# Patient Record
Sex: FEMALE | Race: BLACK OR AFRICAN AMERICAN | NOT HISPANIC OR LATINO | Employment: FULL TIME
[De-identification: names, ages, dates, MRNs, and addresses within clinical notes are randomized per-mention and may not be internally consistent; named-entity substitution may affect disease eponyms.]

---

## 2017-08-19 ENCOUNTER — HEALTH MAINTENANCE LETTER (OUTPATIENT)
Age: 42
End: 2017-08-19

## 2018-06-18 ENCOUNTER — APPOINTMENT (OUTPATIENT)
Dept: CT IMAGING | Facility: CLINIC | Age: 43
End: 2018-06-18
Attending: EMERGENCY MEDICINE
Payer: COMMERCIAL

## 2018-06-18 ENCOUNTER — HOSPITAL ENCOUNTER (EMERGENCY)
Facility: CLINIC | Age: 43
Discharge: HOME | End: 2018-06-18
Attending: EMERGENCY MEDICINE | Admitting: EMERGENCY MEDICINE
Payer: COMMERCIAL

## 2018-06-18 VITALS
HEIGHT: 66 IN | WEIGHT: 161 LBS | BODY MASS INDEX: 25.88 KG/M2 | DIASTOLIC BLOOD PRESSURE: 60 MMHG | HEART RATE: 78 BPM | RESPIRATION RATE: 20 BRPM | TEMPERATURE: 99.1 F | SYSTOLIC BLOOD PRESSURE: 121 MMHG | OXYGEN SATURATION: 98 %

## 2018-06-18 DIAGNOSIS — F07.81 POST CONCUSSIVE SYNDROME: ICD-10-CM

## 2018-06-18 PROCEDURE — 70450 CT HEAD/BRAIN W/O DYE: CPT

## 2018-06-18 PROCEDURE — 99284 EMERGENCY DEPT VISIT MOD MDM: CPT | Mod: 25

## 2018-06-18 RX ORDER — ACETAMINOPHEN 325 MG/1
650 TABLET ORAL ONCE
Status: DISCONTINUED | OUTPATIENT
Start: 2018-06-18 | End: 2018-06-19 | Stop reason: HOSPADM

## 2018-06-18 RX ORDER — IBUPROFEN 600 MG/1
600 TABLET, FILM COATED ORAL ONCE
Status: DISCONTINUED | OUTPATIENT
Start: 2018-06-18 | End: 2018-06-19 | Stop reason: HOSPADM

## 2018-06-18 ASSESSMENT — ENCOUNTER SYMPTOMS
HEADACHES: 1
DIZZINESS: 1
NAUSEA: 1

## 2018-06-18 NOTE — LETTER
June 18, 2018      To Whom It May Concern:      Renetta Sears was seen in our Emergency Department today, 06/18/18.  I expect her condition to improve over the next 3 days.  She may return to work/school when improved.    Sincerely,        Salina Rondon MD

## 2018-06-18 NOTE — ED AVS SNAPSHOT
Emergency Department    [address] [place] [address]    Phone:  [phone]    Fax:  [phone]                                       Renetta Sears   MRN: [id number]    Department:   Emergency Department   Date of Visit:  6/18/2018           After Visit Summary Signature Page     I have received my discharge instructions, and my questions have been answered. I have discussed any challenges I see with this plan with the nurse or doctor.    ..........................................................................................................................................  Patient/Patient Representative Signature      ..........................................................................................................................................  Patient Representative Print Name and Relationship to Patient    ..................................................               ................................................  Date                                            Time    ..........................................................................................................................................  Reviewed by Signature/Title    ...................................................              ..............................................  Date                                                            Time

## 2018-06-19 NOTE — ED PROVIDER NOTES
History     Chief Complaint:  Headache    HPI:   The history is provided by the patient.      Renetta Sears is a 42 year old female who presents to the emergency department with her family for evaluation of a headache. The patient reports that she was hit with football in the left temple yesterday. She states that everything went black and she stumbled around. She feels that she had loss of consciousness but did not fall to the ground. The next day she had a continued left-sided headache that was far more severe than her history of migraines. This has been associated with dizziness and balance issues and while at work she was unable to type at her normal speed. She took ibuprofen around 1300 but with no relief. She presents today concerned of her inability to manage the headache. Here, the patient rates her pain an 8/10 in severity and is feeling slightly nauseated. She is not on any blood thinners and has no other complaints.     Allergies:  Estrogens  Medroxyprogesterone        Medications:    The patient is not currently taking any prescribed medications.     Past Medical History:    Acute cholecystitis   Depression   Esophagitis medicamentosa   PE and infarction   Anxiety   Chronic urethritis     Past Surgical History:    Tonsillectomy and adenoidectomy   Laparoscopic cholecystectomy   Tubal ligation     Family History:    History reviewed. No pertinent family history.      Social History:  Presents with her family    Tobacco use: Never smoker   Alcohol use: Occasionally   PCP: [facility]    Marital Status:  Single     Review of Systems   Eyes: Positive for visual disturbance.   Gastrointestinal: Positive for nausea.   Neurological: Positive for dizziness, syncope and headaches.   All other systems reviewed and are negative.    Physical Exam     Patient Vitals for the past 24 hrs:   BP Temp Temp src Pulse Resp SpO2 Height Weight   06/18/18 2138 121/60 99.1  F (37.3  C) Oral 78 20 98 %  "1.676 m (5' 6\") 73 kg (161 lb)        Physical Exam  General: Patient is alert and normal appearing.  HEENT: Head atraumatic    Eyes: pupils equal and reactive. Conjunctiva clear   Nares: patent   Oropharynx: no lesions, uvula midline, no palatal draping, normal voice, no trismus  Neck: Supple without lymphadenopathy, no meningismus  Chest: Heart regular rate and rhythm.   Lungs: Equal clear to auscultation with no wheeze or rales  Abdomen: Soft, non tender, nondistended, normal bowel sounds  Back: No costovertebral angle tenderness, no midline C, T or L spine tenderness  Neuro: Grossly nonfocal, normal speech, strength equal bilaterally, CN 2-12 intact, normal gait, normal finger to nose, no pronator drift  Extremities: No deformities, equal radial and DP pulses. No clubbing, cyanosis.  No edema  Skin: Warm and dry with no rash.       Emergency Department Course     Imaging:  Radiographic findings were communicated with the patient and family who voiced understanding of the findings.     CT Head without contrast:  IMPRESSION: Normal CT scan of the head. No change.    TARYN MAX MD    Imaging independently reviewed and agree with radiologist interpretation.      Interventions:  2200: Ibuprofen 600 mg PO    2200: Tylenol 650 mg PO       Emergency Department Course:  Past medical records, nursing notes, and vitals reviewed.  2153: I performed an exam of the patient and obtained history, as documented above.     Above interventions provided.      The patient was sent for a CT while in the emergency department, findings above.     I personally reviewed the laboratory results with the Patient and family and answered all related questions prior to discharge.       2229: I rechecked the patient. Findings and plan explained to the Patient and family. Patient discharged home with instructions regarding supportive care, medications, and reasons to return. The importance of close follow-up was reviewed.       Impression & " Plan      Medical Decision Making:  Renetta Sears is a 42 year old female who presents for evaluation after headache, dizziness, nausea 24 hours following trauma to the head.  This patient has a history and clinical exam consistent with concussion.   The differential diagnosis includes skull fracture, concussion, epidural hematoma, subdural hematoma, intracerebral hemorrhage, and traumatic subarachnoid hemorrhage;  CT imaging is reassuring.     Return to ED for red flags (change in behavior, drowsiness, seizures, vomiting, etc) and gave concussion precautions for home.  I did stress importance of avoiding a second concussion while brain heals.  The patients head to toe trauma exam is otherwise negative for serious underlying disease of the head, neck, chest, abdomen, extremities, pelvis. Do not suspect meningitis, SAH, migraine as cause of headache or symptoms today.  Diagnosis:    ICD-10-CM    1. Post concussive syndrome F07.81        Disposition:  Discharged to home with plan as outlined.     Scribe Disclosure:   I, Hesham Pisano, am serving as a scribe at 9:53 PM on 6/18/2018 to document services personally performed by Salina Rondon MD based on my observations and the provider's statements to me.       Hesham Pisano  6/18/2018    EMERGENCY DEPARTMENT       Salina Rondon MD  06/18/18 7118

## 2019-04-21 ENCOUNTER — HOSPITAL ENCOUNTER (EMERGENCY)
Facility: CLINIC | Age: 44
Discharge: HOME | End: 2019-04-21
Attending: EMERGENCY MEDICINE | Admitting: EMERGENCY MEDICINE
Payer: MEDICAID

## 2019-04-21 VITALS
TEMPERATURE: 98.3 F | HEIGHT: 66 IN | RESPIRATION RATE: 16 BRPM | WEIGHT: 154 LBS | DIASTOLIC BLOOD PRESSURE: 72 MMHG | OXYGEN SATURATION: 97 % | SYSTOLIC BLOOD PRESSURE: 100 MMHG | BODY MASS INDEX: 24.75 KG/M2

## 2019-04-21 DIAGNOSIS — H69.93 DYSFUNCTION OF BOTH EUSTACHIAN TUBES: ICD-10-CM

## 2019-04-21 DIAGNOSIS — A59.9 TRICHIMONIASIS: ICD-10-CM

## 2019-04-21 LAB
SPECIMEN SOURCE: ABNORMAL
WET PREP SPEC: ABNORMAL

## 2019-04-21 PROCEDURE — 99284 EMERGENCY DEPT VISIT MOD MDM: CPT

## 2019-04-21 PROCEDURE — 25000132 ZZH RX MED GY IP 250 OP 250 PS 637: Performed by: EMERGENCY MEDICINE

## 2019-04-21 PROCEDURE — 87210 SMEAR WET MOUNT SALINE/INK: CPT | Performed by: EMERGENCY MEDICINE

## 2019-04-21 PROCEDURE — 87591 N.GONORRHOEAE DNA AMP PROB: CPT | Performed by: EMERGENCY MEDICINE

## 2019-04-21 PROCEDURE — 87491 CHLMYD TRACH DNA AMP PROBE: CPT | Performed by: EMERGENCY MEDICINE

## 2019-04-21 RX ORDER — METRONIDAZOLE 500 MG/1
2000 TABLET ORAL ONCE
Qty: 4 TABLET | Refills: 0 | Status: SHIPPED | OUTPATIENT
Start: 2019-04-21 | End: 2019-04-21

## 2019-04-21 RX ORDER — IBUPROFEN 600 MG/1
600 TABLET, FILM COATED ORAL ONCE
Status: COMPLETED | OUTPATIENT
Start: 2019-04-21 | End: 2019-04-21

## 2019-04-21 RX ADMIN — IBUPROFEN 600 MG: 600 TABLET ORAL at 08:15

## 2019-04-21 ASSESSMENT — ENCOUNTER SYMPTOMS
FREQUENCY: 0
DIFFICULTY URINATING: 0
SINUS PRESSURE: 1
COUGH: 0
ABDOMINAL PAIN: 0
FLANK PAIN: 0
SINUS PAIN: 1
DIAPHORESIS: 0
DYSURIA: 0
SORE THROAT: 0
CHILLS: 0
HEMATURIA: 0
FEVER: 0

## 2019-04-21 ASSESSMENT — MIFFLIN-ST. JEOR: SCORE: 1380.29

## 2019-04-21 NOTE — ED PROVIDER NOTES
History     Chief Complaint:  Otalgia and vaginal discharge     HPI   Renetta Sears is a 43 year old female with a history of depression, anxiety and PE who presents to the emergency department ear pain, congestion and vaginal discharge. The patient reports 4-5 days of bilateral ear pain, worse on the left than the right, with associated nasal congestion but no drainage from her ears. She denies cough or sore throat but has been using Flonase and Advil without improvement. In addition to this the patient has also been having abnormal and foul smelling vaginal discharge for the past week. She has a history of prior vaginal infections and denies recent new sexual partners but notes that her partner has recently slept with someone else whose STD status is unknown. She denies associated abdominal pain or urinary symptoms. Her last period ended on 4/18 and was normal. No recent fevers, chills or back pain. The patient is not on birth control and denies recent antibiotic use. No further concerns or symptoms otherwise.     Allergies:  Estrogens  Medroxyprogesterone     Medications:    The patient is not currently taking any prescribed medications.     Past Medical History:    Depression  Esophagitis medicamentosa  Anxiety   PE    Past Surgical History:    T & A  Cholecystectomy   Tubal ligation     Family History:    Diabetes  Hypertension   Arthritis  Cardiovascular   Thyroid disease     Social History:  The patient was not accompanied to the ED.  Smoking Status: Never  Smokeless Tobacco: Never  Alcohol Use: Yes, occasional    Marital Status:  Single [1]     Review of Systems   Constitutional: Negative for chills, diaphoresis and fever.   HENT: Positive for congestion, ear pain, sinus pressure and sinus pain. Negative for ear discharge and sore throat.    Respiratory: Negative for cough.    Gastrointestinal: Negative for abdominal pain.   Genitourinary: Positive for vaginal discharge. Negative for difficulty  "urinating, dysuria, flank pain, frequency, hematuria, pelvic pain, urgency, vaginal bleeding and vaginal pain.   All other systems reviewed and are negative.        Physical Exam     Patient Vitals for the past 24 hrs:   BP Temp Temp src Heart Rate Resp SpO2 Height Weight   04/21/19 0738 100/72 98.3  F (36.8  C) Oral 87 16 97 % 1.676 m (5' 6\") 69.9 kg (154 lb)       Physical Exam  Constitutional: Black female supine  HENT: TMs clear bilaterally, good light reflex, no congestion.   Face: Mild tenderness over sinuses. Nares clear. Oropharynx clear no adenopathy  Abdomen: Soft and no tenderness  Neuro: Alert awake appropriate.   : Vulva negative. Vaginal, small amount of whitish discharge. Cervix closed. No motion tenderness. No uterine tenderness or masses.     Emergency Department Course     Laboratory:  Laboratory findings were communicated with the patient who voiced understanding of the findings.    Wet prep: Trichomonas seen     Chlamydia trachomatis PCR: Pending   Neisseria gonorrhoea PCR: Pending  Interventions:  0815 - Advil tablet 600 mg PO     Emergency Department Course:  Nursing notes and vitals reviewed.    0739: I performed an exam of the patient as documented above.     0809: The patient had a pelvic exam performed here in the emergency department, which she tolerated without difficulty. This was done in the presence of a female chaperone.    0852: Labs reviewed. Patient rechecked and updated.      Findings and plan explained to the Patient. Patient discharged home with instructions regarding supportive care, medications, and reasons to return. The importance of close follow-up was reviewed.     Impression & Plan      Medical Decision Making:  Patient is a 43 year old female who presents with 2 complaints, one that she has ear congestion and facial congestion for the past week. She has tried some Flonase without much help. The other is that she has developed some vaginal discharge over the last 4-5 " days and her boyfriend told her he had been with another women but he stated he wore a condom. She has no abdominal pain, no fever, no drainage coming from her ears. On exam her ears appear clear, her oropharynx is clear and neck shows no adenopathy. This seems most consistent with eustachian tube dysfunction. Her abdomen is non tender and her pelvic exam reveals some minimal discharge. There was no motion tenderness or masses. Wet prep has come back positive for trich. I have sent off a GC and chlamydia as well. Her last period ended a few days ago and was normal for her. I will give her 2 grams of flagyl and have told her not to have intercourse again with her boyfriend until he has also had his 2 grams. I recommended using afrin for 3 days for the facial congestion as well.     Diagnosis:    ICD-10-CM    1. Trichimoniasis A59.9    2. Dysfunction of both eustachian tubes H69.83        Disposition:  discharged to home    Discharge Medications:     Medication List      Started    metroNIDAZOLE 500 MG tablet  Commonly known as:  FLAGYL  2,000 mg, Oral, ONCE            Chaya Argueta  4/21/2019    EMERGENCY DEPARTMENT  Chaya AGUSTIN am serving as a scribe at 7:39 AM on 4/21/2019 to document services personally performed by Yehuda Medina MD based on my observations and the provider's statements to me.      Yehuda Medina MD  04/21/19 1538

## 2019-04-21 NOTE — ED AVS SNAPSHOT
Emergency Department  [address] [place] [address]  Phone:  [phone]  Fax:  [phone]                                    Renetta Sears   MRN: [id number]    Department:   Emergency Department   Date of Visit:  4/21/2019           After Visit Summary Signature Page    I have received my discharge instructions, and my questions have been answered. I have discussed any challenges I see with this plan with the nurse or doctor.    ..........................................................................................................................................  Patient/Patient Representative Signature      ..........................................................................................................................................  Patient Representative Print Name and Relationship to Patient    ..................................................               ................................................  Date                                   Time    ..........................................................................................................................................  Reviewed by Signature/Title    ...................................................              ..............................................  Date                                               Time          22EPIC Rev 08/18

## 2019-04-22 LAB
C TRACH DNA SPEC QL NAA+PROBE: NEGATIVE
N GONORRHOEA DNA SPEC QL NAA+PROBE: NEGATIVE
SPECIMEN SOURCE: NORMAL
SPECIMEN SOURCE: NORMAL

## 2019-04-22 NOTE — RESULT ENCOUNTER NOTE
Final result for both N. Gonorrhoeae PCR and Chlamydia Trachomatis PCR are NEGATIVE.  No treatment or change in treatment per [facility] ED Lab Result protocol.

## 2019-05-24 ENCOUNTER — HOSPITAL ENCOUNTER (EMERGENCY)
Facility: CLINIC | Age: 44
Discharge: HOME | End: 2019-05-24
Admitting: EMERGENCY MEDICINE
Payer: MEDICAID

## 2019-05-24 VITALS
DIASTOLIC BLOOD PRESSURE: 61 MMHG | HEIGHT: 66 IN | TEMPERATURE: 98.9 F | RESPIRATION RATE: 20 BRPM | OXYGEN SATURATION: 98 % | WEIGHT: 165 LBS | HEART RATE: 71 BPM | BODY MASS INDEX: 26.52 KG/M2 | SYSTOLIC BLOOD PRESSURE: 113 MMHG

## 2019-05-24 DIAGNOSIS — B96.89 BACTERIAL VAGINOSIS: ICD-10-CM

## 2019-05-24 DIAGNOSIS — N89.8 VAGINAL DISCHARGE: ICD-10-CM

## 2019-05-24 DIAGNOSIS — N76.0 BACTERIAL VAGINOSIS: ICD-10-CM

## 2019-05-24 LAB
ALBUMIN UR-MCNC: NEGATIVE MG/DL
APPEARANCE UR: CLEAR
BILIRUB UR QL STRIP: NEGATIVE
COLOR UR AUTO: YELLOW
GLUCOSE UR STRIP-MCNC: NEGATIVE MG/DL
HCG UR QL: NEGATIVE
HGB UR QL STRIP: NEGATIVE
KETONES UR STRIP-MCNC: NEGATIVE MG/DL
LEUKOCYTE ESTERASE UR QL STRIP: NEGATIVE
MUCOUS THREADS #/AREA URNS LPF: PRESENT /LPF
NITRATE UR QL: NEGATIVE
PH UR STRIP: 5.5 PH (ref 5–7)
RBC #/AREA URNS AUTO: 0 /HPF (ref 0–2)
SOURCE: ABNORMAL
SP GR UR STRIP: 1.02 (ref 1–1.03)
SPECIMEN SOURCE: ABNORMAL
SQUAMOUS #/AREA URNS AUTO: 7 /HPF (ref 0–1)
UROBILINOGEN UR STRIP-MCNC: NORMAL MG/DL (ref 0–2)
WBC #/AREA URNS AUTO: 5 /HPF (ref 0–5)
WET PREP SPEC: ABNORMAL

## 2019-05-24 PROCEDURE — 87491 CHLMYD TRACH DNA AMP PROBE: CPT | Performed by: PHYSICIAN ASSISTANT

## 2019-05-24 PROCEDURE — 87591 N.GONORRHOEAE DNA AMP PROB: CPT | Performed by: PHYSICIAN ASSISTANT

## 2019-05-24 PROCEDURE — 99283 EMERGENCY DEPT VISIT LOW MDM: CPT

## 2019-05-24 PROCEDURE — 87210 SMEAR WET MOUNT SALINE/INK: CPT | Performed by: PHYSICIAN ASSISTANT

## 2019-05-24 PROCEDURE — 81025 URINE PREGNANCY TEST: CPT | Performed by: PHYSICIAN ASSISTANT

## 2019-05-24 PROCEDURE — 81001 URINALYSIS AUTO W/SCOPE: CPT | Performed by: PHYSICIAN ASSISTANT

## 2019-05-24 RX ORDER — METRONIDAZOLE 500 MG/1
500 TABLET ORAL 2 TIMES DAILY
Qty: 14 TABLET | Refills: 0 | Status: SHIPPED | OUTPATIENT
Start: 2019-05-24 | End: 2019-05-31

## 2019-05-24 ASSESSMENT — ENCOUNTER SYMPTOMS
FREQUENCY: 0
ABDOMINAL PAIN: 0

## 2019-05-24 ASSESSMENT — MIFFLIN-ST. JEOR: SCORE: 1430.19

## 2019-05-24 NOTE — ED AVS SNAPSHOT
Emergency Department  [address] [place] [address]  Phone:  [phone]  Fax:  [phone]                                    Renetta Sears   MRN: [id number]    Department:   Emergency Department   Date of Visit:  5/24/2019           After Visit Summary Signature Page    I have received my discharge instructions, and my questions have been answered. I have discussed any challenges I see with this plan with the nurse or doctor.    ..........................................................................................................................................  Patient/Patient Representative Signature      ..........................................................................................................................................  Patient Representative Print Name and Relationship to Patient    ..................................................               ................................................  Date                                   Time    ..........................................................................................................................................  Reviewed by Signature/Title    ...................................................              ..............................................  Date                                               Time          22EPIC Rev 08/18

## 2019-05-25 NOTE — DISCHARGE INSTRUCTIONS
Wear looser fitted underwear, cotton underwear, avoid soaps and washing in the anal area.  Follow-up with primary for recheck if symptoms persist.  He will get called in the next 48 hours of the STD panel is positive.

## 2019-05-25 NOTE — ED PROVIDER NOTES
History     Chief Complaint:  Vaginal Discharge    HPI  Renetta Sears is a 43 year old female who presents to the emergency department today for evaluation of vaginal discharge. The patient reports that she began noticing a cloudy vaginal discharge two days ago. She also observed a bad odor from this discharge earlier today. She denies abdominal pain, frequency, urgency, or concern for STD exposure. She notes that she has been sexually active with her partner and has been using condoms. Of note, the patient was diagnosed with trichomonas vaginalis on 04/21/2019 and has also reportedly been infected with bacterial vaginosis and yeast infection in the past. She additionally reports that she has been dealing with a concussion for a year and is still having headaches and muscle spasms.    Allergies:  Estrogens  Medroxyprogesterone    Medications:    Norco  Ibuprofen  Robaxin    Past Medical History:    Acute cholecystitis  Esophagitis medicamentosa  Other pulmonary embolism and infarction   Syncope  Obesity  Anxiety  Chronic urethritis  Chronic headache  Moderate major depression    Past Surgical History:    Laparoscopic cholecystectomy  Tonsillectomy  Adenoidectomy  Tubal ligation    Family History:    The patient's family history includes Arthritis in her maternal grandmother and mother; Breast Cancer in an other family member; Cancer - colorectal in her paternal uncle; Cardiovascular in her maternal grandmother; Depression in her paternal grandmother; Diabetes in her maternal grandmother and paternal grandmother; Hypertension in her maternal grandfather and maternal grandmother; Thyroid Disease in her maternal grandmother and mother.    Social History:  The patient reports that she has never smoked. She has never used smokeless tobacco. She reports that she drinks alcohol. She reports that she does not use drugs.    Review of Systems   Gastrointestinal: Negative for abdominal pain.   Genitourinary: Positive for  "vaginal discharge. Negative for frequency and urgency.   All other systems reviewed and are negative.    Physical Exam     Patient Vitals for the past 24 hrs:   BP Temp Temp src Pulse Resp SpO2 Height Weight   05/24/19 2104 113/61 98.9  F (37.2  C) Temporal 71 20 98 % 1.676 m (5' 6\") 74.8 kg (165 lb)     Physical Exam  General: Well appearing, well nourished. Normal mood and affect.  Skin: Good turgor, no rash, no unusual bruising or prominent lesions.  HEENT: Head: Normocephalic, atraumatic, no visible masses.   Eyes: Conjunctiva clear.  Cardiac: Normal rate and regular rhythm, no murmur or gallop.   Lungs: Clear to auscultation.  Abdomen: Abdomen soft, non-tender. No rebound tenderness of guarding.  No CVA tenderness bilaterally.  Musculoskeletal: Normal gait and station.  Neurologic: Oriented x 3. GCS: 15.  Psychiatric: Intact recent and remote memory, judgment and insight, normal mood and affect.   Pelvic: Thin clear cloudy discharge seen throughout vaginal vault and surrounding cervix.  Vagina and cervix without lesions. Uterus and adnexa/parametria nontender without masses.    Emergency Department Course     Laboratory:  Laboratory findings were communicated with the patient who voiced understanding of the findings.    UA with Microscopic: Color Yellow, Appearance Clear, Squamous Epithelial 7 (H), Mucous Present (A), o/w negative or WNL   HCG qualitative urine: Negative  Wet prep: Specimen Description Vagina, Clue cells seen (A), o/w negative or WNL  Neisseria gonorrhoeae: In process  Chlamydia trachomatis: In process    Emergency Department Course:    2125 Nursing notes and vitals reviewed.    2131 I performed a pelvic exam of the patient as documented above it the presence of a female chaperone.     2138 The patient provided a urine sample here in the emergency department. This was sent for laboratory testing, findings above.    2212 I personally reviewed the laboratory results with the patient and answered " all related questions prior to discharge.    Impression & Plan      Medical Decision Making:  Renetta Sears is a 43 year old female who presents for evaluation of vaginal discharge. Details of the patient's history can be noted in the HPI. Differential diagnosis included bacterial vaginosis, vaginal candidiasis, trichomonas, gonorrhea, chlamydia, PID, syphilis, HIV, appendicitis, diverticulitis, UTI, amongst others. Wet prep was completed, results confirming bacterial vaginosis. Gonorrhea and chlamydia swabs pending at this time. UA negative for infection.  UPT negative. Metronidazole will be initiated. Side effects of this medication discussed with the patient, she will not drink alcohol with this medication. They will follow-up with her primary care provider within the next few days for recheck of symptoms. If additional STD testing is desired for infection such as syphilis or HIV, this will be discussed with her primary. They will return to the ED for any changing or worsening symptoms, worsening pain, persistent nausea or vomiting, fevers, new concerns. No signs of PID or more concerning infection on exam here today. No abdominal or CVA tenderness. All questions were answered prior to the patient's discharge. They were in agreement with the treatment plan as stated above.    Diagnosis:    ICD-10-CM    1. Bacterial vaginosis N76.0     B96.89    2. Vaginal discharge N89.8      Disposition:   The patient is discharged to home.    Discharge Medications:  START taking      Dose / Directions   metroNIDAZOLE 500 MG tablet  Commonly known as:  FLAGYL      Dose:  500 mg  Take 1 tablet (500 mg) by mouth 2 times daily for 7 days  Quantity:  14 tablet  Refills:  0           Where to get your medicines      Some of these will need a paper prescription and others can be bought over the counter. Ask your nurse if you have questions.    Bring a paper prescription for each of these medications    metroNIDAZOLE 500 MG  tablet         Scribe Disclosure:  I, Yusef Rand, am serving as a scribe at 9:25 PM on 5/24/2019 to document services personally performed by Radha Purdy PA-C based on my observations and the provider's statements to me.    This was created at least in part with a voice recognition software. Mistakes/typos may be present.      EMERGENCY DEPARTMENT       Radha Purdy PA  05/24/19 3238

## 2019-07-14 ENCOUNTER — HOSPITAL ENCOUNTER (EMERGENCY)
Facility: CLINIC | Age: 44
Discharge: HOME | End: 2019-07-15
Attending: EMERGENCY MEDICINE | Admitting: EMERGENCY MEDICINE
Payer: MEDICAID

## 2019-07-14 DIAGNOSIS — A59.01 TRICHOMONAS VAGINALIS (TV) INFECTION: ICD-10-CM

## 2019-07-14 PROCEDURE — 96372 THER/PROPH/DIAG INJ SC/IM: CPT

## 2019-07-14 PROCEDURE — 99284 EMERGENCY DEPT VISIT MOD MDM: CPT

## 2019-07-14 ASSESSMENT — MIFFLIN-ST. JEOR: SCORE: 1389.36

## 2019-07-14 NOTE — ED AVS SNAPSHOT
Emergency Department  [address] [place] [address]  Phone:  [phone]  Fax:  [phone]                                    Renetta Sears   MRN: [id number]    Department:   Emergency Department   Date of Visit:  7/14/2019           After Visit Summary Signature Page    I have received my discharge instructions, and my questions have been answered. I have discussed any challenges I see with this plan with the nurse or doctor.    ..........................................................................................................................................  Patient/Patient Representative Signature      ..........................................................................................................................................  Patient Representative Print Name and Relationship to Patient    ..................................................               ................................................  Date                                   Time    ..........................................................................................................................................  Reviewed by Signature/Title    ...................................................              ..............................................  Date                                               Time          22EPIC Rev 08/18

## 2019-07-14 NOTE — LETTER
July 15, 2019      To Whom It May Concern:      Renetta Sears was seen in our Emergency Department today, 07/15/19.  I expect her condition to improve over the next 1-2 days.  She may return to work/school when improved.    Sincerely,        Cristobal Mckeon MD

## 2019-07-15 VITALS
OXYGEN SATURATION: 99 % | SYSTOLIC BLOOD PRESSURE: 112 MMHG | RESPIRATION RATE: 16 BRPM | BODY MASS INDEX: 25.07 KG/M2 | DIASTOLIC BLOOD PRESSURE: 74 MMHG | HEIGHT: 66 IN | HEART RATE: 83 BPM | WEIGHT: 156 LBS | TEMPERATURE: 98.4 F

## 2019-07-15 LAB
SPECIMEN SOURCE: ABNORMAL
WET PREP SPEC: ABNORMAL

## 2019-07-15 PROCEDURE — 87491 CHLMYD TRACH DNA AMP PROBE: CPT | Performed by: EMERGENCY MEDICINE

## 2019-07-15 PROCEDURE — 87210 SMEAR WET MOUNT SALINE/INK: CPT | Performed by: EMERGENCY MEDICINE

## 2019-07-15 PROCEDURE — 25000132 ZZH RX MED GY IP 250 OP 250 PS 637: Performed by: EMERGENCY MEDICINE

## 2019-07-15 PROCEDURE — 25000125 ZZHC RX 250: Performed by: EMERGENCY MEDICINE

## 2019-07-15 PROCEDURE — 25000128 H RX IP 250 OP 636: Performed by: EMERGENCY MEDICINE

## 2019-07-15 PROCEDURE — 96372 THER/PROPH/DIAG INJ SC/IM: CPT

## 2019-07-15 PROCEDURE — 87591 N.GONORRHOEAE DNA AMP PROB: CPT | Performed by: EMERGENCY MEDICINE

## 2019-07-15 RX ORDER — FLUCONAZOLE 150 MG/1
TABLET ORAL
Qty: 2 TABLET | Refills: 0 | Status: SHIPPED | OUTPATIENT
Start: 2019-07-15 | End: 2019-07-18

## 2019-07-15 RX ORDER — ONDANSETRON 4 MG/1
4 TABLET, ORALLY DISINTEGRATING ORAL EVERY 8 HOURS PRN
Qty: 10 TABLET | Refills: 0 | Status: SHIPPED | OUTPATIENT
Start: 2019-07-15 | End: 2019-07-18

## 2019-07-15 RX ORDER — METRONIDAZOLE 500 MG/1
2000 TABLET ORAL ONCE
Status: COMPLETED | OUTPATIENT
Start: 2019-07-15 | End: 2019-07-15

## 2019-07-15 RX ORDER — AZITHROMYCIN 250 MG/1
1000 TABLET, FILM COATED ORAL ONCE
Status: COMPLETED | OUTPATIENT
Start: 2019-07-15 | End: 2019-07-15

## 2019-07-15 RX ADMIN — LIDOCAINE HYDROCHLORIDE 250 MG: 10 INJECTION, SOLUTION EPIDURAL; INFILTRATION; INTRACAUDAL; PERINEURAL at 03:04

## 2019-07-15 RX ADMIN — METRONIDAZOLE 2000 MG: 500 TABLET ORAL at 03:04

## 2019-07-15 RX ADMIN — AZITHROMYCIN 1000 MG: 250 TABLET, FILM COATED ORAL at 03:04

## 2019-07-15 ASSESSMENT — ENCOUNTER SYMPTOMS
MYALGIAS: 0
ABDOMINAL PAIN: 0
DYSURIA: 0
CHILLS: 0
FEVER: 0

## 2019-07-15 NOTE — ED PROVIDER NOTES
History     Chief Complaint:  Vaginal Discharge     HPI   Renetta Sears is a 43 year old female who presents to the emergency department today for evaluation of vaginal discharge. The patient reports three days of thin, cloudy, odd smelling vaginal discharge. She reports a history of trichomonas infection and states that she suspects a similar diagnosis with her current symptoms. The patient states that she and her fiance recently spent six months apart, during which time he may have been with other partners. She denies any vaginal itching, vaginal burning, dysuria, dyspareunia, abdominal pain, fever, chills, or body aches. Of note, the patient states that her periods have been regular and on schedule.     Allergies:  Estrogens  Medroxyprogesterone     Medications:    Magnesium Oxide    Past Medical History:    Acute cholecystitis  Depression  Esophagitis medicamentosa  Pulmonary embolism and infarction  Headache  Anxiety  Syncope  Chronic urethritis   Post concussion syndrome   Thyroid nodule  Concussion  Seborrheic dermatitis  Hidradenitis suppurativa  Hyperlipidemia  Varicella     Past Surgical History:    Tonsillectomy and adenoidectomy  Laparoscopic cholecystectomy  Tubal ligation  Hernia repair  Cervix surgery    Family History:    Mother: arthritis, thyroid disease, depression, hyperlipidemia   Father: diabetes, hypertension, depression, hyperlipidemia, rheumatoid arthritis  Sister: depression, mental disorder    Social History:  Smoking Status: Never Smoker  Smokeless Tobacco: Never Used  Alcohol Use: Positive  Drug Use: Negative  PCP: [facility], [facility]  Marital Status:  Single      Review of Systems   Constitutional: Negative for chills and fever.   Gastrointestinal: Negative for abdominal pain.   Genitourinary: Positive for vaginal discharge. Negative for dyspareunia, dysuria, menstrual problem and vaginal pain (itching, burning).   Musculoskeletal: Negative for myalgias.   All other  "systems reviewed and are negative.      Physical Exam     Patient Vitals for the past 24 hrs:   BP Temp Temp src Pulse Heart Rate Resp SpO2 Height Weight   07/15/19 0312 112/74 -- -- 83 -- -- -- -- --   07/15/19 0118 101/65 -- -- 72 -- -- -- -- --   07/15/19 0117 101/65 -- -- -- 73 16 99 % -- --   07/14/19 2345 107/60 98.4  F (36.9  C) Temporal -- 72 16 98 % -- --   07/14/19 2342 -- -- -- -- -- -- -- 1.676 m (5' 6\") 70.8 kg (156 lb)     Physical Exam  Constitutional:  Alert, answers questions appropriately.   Neck:    Normal range of motion   HEENT:  Pupils round, equal  Pulmonary/Chest:  Effort normal.   GI:   No abdominal tenderness or masses. Normal bowel sounds.   :    Moderate amount of cloudy, thin vaginal discharge. No cervical erythema or inflammation. No tenderness.   Neurological:   No facial asymmetry, gait intact  Psychiatric:   The patient has a normal mood and affect.     Emergency Department Course     Laboratory:  Laboratory findings were communicated with the patient who voiced understanding of the findings.    Wet Prep: Trichomonas seen (A), Clue cells seen (A), o/w WNL  Chlamydia trachomatis PCR: pending  Neisseria Gonorrhea PCR: pending    Interventions:  0304 Rocephin 350 mg IM  0304 Zithromax 1000 mg Oral  0304 Flagyl 2000 mg Oral    Emergency Department Course:    2352 Nursing notes and vitals reviewed.    0136 I performed an exam of the patient as documented above.     0213 I performed a pelvic exam of the patient as documented above in the presence of a female chaperone.     0315 Patient rechecked and updated.     0329 Findings and plan explained to the Patient. Patient discharged home with instructions regarding supportive care, medications, and reasons to return. The importance of close follow-up was reviewed. The patient was prescribed diflucan and zofran.    Impression & Plan      Medical Decision Making:  Renetta Sears is a 43 year old female who presents with vaginal discharge " similar to a previous diagnosis of a trichomonas infection. Trichomonas and clue cells seen on wet prep. Chlamydia and gonorrhea pending. The patient was treated empirically with Rocephin and Azithromycin here in the ED.  The patient was informed that we are not providing comprehensive STD testing or treatment and that he needs to follow up with a STD clinic or his primary care provider for complete testing. Additionally, the patient was informed that they need to abstain from sexual activity until cleared at follow up and for at least 14 days.  The patient was also told that results will come back at a later date and they will be contacted only if positive. Patient discharged in stable condition.     Diagnosis:    ICD-10-CM    1. Trichomonas vaginalis (TV) infection A59.01      Disposition:   The patient is discharged to home.    Discharge Medications:        START taking      Dose / Directions   fluconazole 150 MG tablet  Commonly known as:  DIFLUCAN      Take one tablet now, and one tablet in three days  Quantity:  2 tablet  Refills:  0     ondansetron 4 MG ODT tab  Commonly known as:  ZOFRAN ODT      Dose:  4 mg  Take 1 tablet (4 mg) by mouth every 8 hours as needed for nausea  Quantity:  10 tablet  Refills:  0           Where to get your medicines      Some of these will need a paper prescription and others can be bought over the counter. Ask your nurse if you have questions.    Bring a paper prescription for each of these medications    fluconazole 150 MG tablet    ondansetron 4 MG ODT tab       Scribe Disclosure:  Neida AGUSTIN, am serving as a scribe at 11:56 PM on 7/14/2019 to document services personally performed by Cristobal Mckeon MD based on my observations and the provider's statements to me.     EMERGENCY DEPARTMENT       Cristobal Mckeon MD  07/15/19 4688

## 2019-10-07 ENCOUNTER — HOSPITAL ENCOUNTER (EMERGENCY)
Facility: CLINIC | Age: 44
Discharge: HOME | End: 2019-10-07
Attending: EMERGENCY MEDICINE | Admitting: EMERGENCY MEDICINE
Payer: COMMERCIAL

## 2019-10-07 ENCOUNTER — APPOINTMENT (OUTPATIENT)
Dept: CT IMAGING | Facility: CLINIC | Age: 44
End: 2019-10-07
Attending: EMERGENCY MEDICINE
Payer: COMMERCIAL

## 2019-10-07 VITALS
WEIGHT: 156.53 LBS | SYSTOLIC BLOOD PRESSURE: 106 MMHG | DIASTOLIC BLOOD PRESSURE: 68 MMHG | RESPIRATION RATE: 16 BRPM | BODY MASS INDEX: 25.26 KG/M2 | TEMPERATURE: 98.8 F | OXYGEN SATURATION: 99 %

## 2019-10-07 DIAGNOSIS — N85.2 ENLARGED UTERUS: ICD-10-CM

## 2019-10-07 DIAGNOSIS — R10.84 ABDOMINAL PAIN, GENERALIZED: ICD-10-CM

## 2019-10-07 DIAGNOSIS — K59.00 CONSTIPATION, UNSPECIFIED CONSTIPATION TYPE: ICD-10-CM

## 2019-10-07 LAB
ALBUMIN SERPL-MCNC: 3.6 G/DL (ref 3.4–5)
ALP SERPL-CCNC: 64 U/L (ref 40–150)
ALT SERPL W P-5'-P-CCNC: 25 U/L (ref 0–50)
ANION GAP SERPL CALCULATED.3IONS-SCNC: 4 MMOL/L (ref 3–14)
AST SERPL W P-5'-P-CCNC: 14 U/L (ref 0–45)
BASOPHILS # BLD AUTO: 0 10E9/L (ref 0–0.2)
BASOPHILS NFR BLD AUTO: 0.3 %
BILIRUB SERPL-MCNC: 1.3 MG/DL (ref 0.2–1.3)
BUN SERPL-MCNC: 6 MG/DL (ref 7–30)
CALCIUM SERPL-MCNC: 8.4 MG/DL (ref 8.5–10.1)
CHLORIDE SERPL-SCNC: 107 MMOL/L (ref 94–109)
CO2 SERPL-SCNC: 26 MMOL/L (ref 20–32)
CREAT SERPL-MCNC: 0.68 MG/DL (ref 0.52–1.04)
DIFFERENTIAL METHOD BLD: ABNORMAL
EOSINOPHIL # BLD AUTO: 0.1 10E9/L (ref 0–0.7)
EOSINOPHIL NFR BLD AUTO: 3.6 %
ERYTHROCYTE [DISTWIDTH] IN BLOOD BY AUTOMATED COUNT: 12.2 % (ref 10–15)
GFR SERPL CREATININE-BSD FRML MDRD: >90 ML/MIN/{1.73_M2}
GLUCOSE SERPL-MCNC: 101 MG/DL (ref 70–99)
HCT VFR BLD AUTO: 42.1 % (ref 35–47)
HGB BLD-MCNC: 14.5 G/DL (ref 11.7–15.7)
IMM GRANULOCYTES # BLD: 0 10E9/L (ref 0–0.4)
IMM GRANULOCYTES NFR BLD: 0.3 %
LIPASE SERPL-CCNC: 35 U/L (ref 73–393)
LYMPHOCYTES # BLD AUTO: 0.9 10E9/L (ref 0.8–5.3)
LYMPHOCYTES NFR BLD AUTO: 23.1 %
MCH RBC QN AUTO: 31.7 PG (ref 26.5–33)
MCHC RBC AUTO-ENTMCNC: 34.4 G/DL (ref 31.5–36.5)
MCV RBC AUTO: 92 FL (ref 78–100)
MONOCYTES # BLD AUTO: 0.5 10E9/L (ref 0–1.3)
MONOCYTES NFR BLD AUTO: 12.7 %
NEUTROPHILS # BLD AUTO: 2.3 10E9/L (ref 1.6–8.3)
NEUTROPHILS NFR BLD AUTO: 60 %
NRBC # BLD AUTO: 0 10*3/UL
NRBC BLD AUTO-RTO: 0 /100
PLATELET # BLD AUTO: 215 10E9/L (ref 150–450)
POTASSIUM SERPL-SCNC: 3.5 MMOL/L (ref 3.4–5.3)
PROT SERPL-MCNC: 7 G/DL (ref 6.8–8.8)
RBC # BLD AUTO: 4.58 10E12/L (ref 3.8–5.2)
SODIUM SERPL-SCNC: 137 MMOL/L (ref 133–144)
WBC # BLD AUTO: 3.9 10E9/L (ref 4–11)

## 2019-10-07 PROCEDURE — 96360 HYDRATION IV INFUSION INIT: CPT

## 2019-10-07 PROCEDURE — 25000132 ZZH RX MED GY IP 250 OP 250 PS 637: Performed by: EMERGENCY MEDICINE

## 2019-10-07 PROCEDURE — 99285 EMERGENCY DEPT VISIT HI MDM: CPT | Mod: 25

## 2019-10-07 PROCEDURE — 96375 TX/PRO/DX INJ NEW DRUG ADDON: CPT

## 2019-10-07 PROCEDURE — 80053 COMPREHEN METABOLIC PANEL: CPT | Performed by: EMERGENCY MEDICINE

## 2019-10-07 PROCEDURE — 83690 ASSAY OF LIPASE: CPT | Performed by: EMERGENCY MEDICINE

## 2019-10-07 PROCEDURE — 85025 COMPLETE CBC W/AUTO DIFF WBC: CPT | Performed by: EMERGENCY MEDICINE

## 2019-10-07 PROCEDURE — 96374 THER/PROPH/DIAG INJ IV PUSH: CPT | Mod: 59

## 2019-10-07 PROCEDURE — 74177 CT ABD & PELVIS W/CONTRAST: CPT

## 2019-10-07 PROCEDURE — 25000128 H RX IP 250 OP 636

## 2019-10-07 PROCEDURE — 96361 HYDRATE IV INFUSION ADD-ON: CPT | Mod: 59

## 2019-10-07 PROCEDURE — 25000128 H RX IP 250 OP 636: Performed by: EMERGENCY MEDICINE

## 2019-10-07 PROCEDURE — 25000125 ZZHC RX 250: Performed by: EMERGENCY MEDICINE

## 2019-10-07 RX ORDER — ONDANSETRON 2 MG/ML
INJECTION INTRAMUSCULAR; INTRAVENOUS
Status: COMPLETED
Start: 2019-10-07 | End: 2019-10-07

## 2019-10-07 RX ORDER — IOPAMIDOL 755 MG/ML
79 INJECTION, SOLUTION INTRAVASCULAR ONCE
Status: COMPLETED | OUTPATIENT
Start: 2019-10-07 | End: 2019-10-07

## 2019-10-07 RX ORDER — HYDROMORPHONE HYDROCHLORIDE 1 MG/ML
0.5 INJECTION, SOLUTION INTRAMUSCULAR; INTRAVENOUS; SUBCUTANEOUS
Status: DISCONTINUED | OUTPATIENT
Start: 2019-10-07 | End: 2019-10-07 | Stop reason: HOSPADM

## 2019-10-07 RX ORDER — IBUPROFEN 600 MG/1
600 TABLET, FILM COATED ORAL ONCE
Status: COMPLETED | OUTPATIENT
Start: 2019-10-07 | End: 2019-10-07

## 2019-10-07 RX ORDER — SODIUM CHLORIDE 9 MG/ML
1000 INJECTION, SOLUTION INTRAVENOUS CONTINUOUS
Status: DISCONTINUED | OUTPATIENT
Start: 2019-10-07 | End: 2019-10-07 | Stop reason: HOSPADM

## 2019-10-07 RX ADMIN — SODIUM CHLORIDE 64 ML: 9 INJECTION, SOLUTION INTRAVENOUS at 15:14

## 2019-10-07 RX ADMIN — ONDANSETRON 4 MG: 2 INJECTION INTRAMUSCULAR; INTRAVENOUS at 14:49

## 2019-10-07 RX ADMIN — IOPAMIDOL 79 ML: 755 INJECTION, SOLUTION INTRAVENOUS at 15:14

## 2019-10-07 RX ADMIN — IBUPROFEN 600 MG: 600 TABLET ORAL at 16:39

## 2019-10-07 RX ADMIN — SODIUM CHLORIDE 1000 ML: 9 INJECTION, SOLUTION INTRAVENOUS at 14:49

## 2019-10-07 RX ADMIN — HYDROMORPHONE HYDROCHLORIDE 0.5 MG: 1 INJECTION, SOLUTION INTRAMUSCULAR; INTRAVENOUS; SUBCUTANEOUS at 14:50

## 2019-10-07 ASSESSMENT — ENCOUNTER SYMPTOMS
FREQUENCY: 0
BACK PAIN: 0
VOMITING: 0
NAUSEA: 0
ABDOMINAL PAIN: 1
CONSTIPATION: 0
DIARRHEA: 0

## 2019-10-07 NOTE — ED PROVIDER NOTES
History     Chief Complaint:  Abdominal Pain      HPI   Renetta Sears is a 44 year old female s/p cholecystectomy in 2004, who presents with worsening generalized abdominal pain beginning at 5:00 AM yesterday. The patient describes that her pain is worse in the right upper quadrant. She reports that she has been resting since onset of her pain, but notes that her discomfort has continued to increase. She describes that she has been unable to consume any food and notes that even when she drinks water her pain worsens. She rates her pain severity as 9/10 and states that breathing deeply increases her pain. She denies any alleviating factors. Denies nausea, diarrhea, constipation, urinary frequency, dysuria, vaginal bleeding, vaginal discharge, or back pain. She denies frequent alcohol use.     Allergies:  Estrogens  Medroxyprogesterone     Medications:    Norco   Mag ox   Robaxin     Past Medical History:    Acute cholecystitis  Depression  Esophagitis medicamentosa   Other pulmonary embolism and infarction   Anxiety  Obesity   Chronic urethritis   Chronic headache    Past Surgical History:    T&A  Lap cholecystectomy   Tubal ligation     Family History:    DM   HTN  Arthritis  Thyroid disease  Breast cancer  Colorectal cancer     Social History:  Smoking status: none   Alcohol use: yes   Drug use: no   PCP: [facility]  Marital Status:  Single      Review of Systems   Gastrointestinal: Positive for abdominal pain. Negative for constipation, diarrhea, nausea and vomiting.   Genitourinary: Negative for frequency, vaginal bleeding and vaginal discharge.   Musculoskeletal: Negative for back pain.   All other systems reviewed and are negative.      Physical Exam     Patient Vitals for the past 24 hrs:   BP Temp Temp src Resp SpO2 Weight   10/07/19 1520 106/68 -- -- -- 99 % --   10/07/19 1300 121/69 98.8  F (37.1  C) Oral 16 100 % 71 kg (156 lb 8.4 oz)          Physical Exam  Eyes:  The pupils are  equal and round    Conjunctivae and sclerae are normal  ENT:    The nose is normal    Pinnae are normal  CV:  Regular rate and rhythm     No edema  Resp:  Lungs are clear    Non-labored    No rales    No wheezing   GI:  Abdomen is soft with generalized tenderness worse in the RUQ and braydon-umbilical area, there is no rigidity    No distension    No rebound tenderness   MS:  Normal muscular tone    No asymmetric leg swelling  Skin:  No rash or acute skin lesions noted  Neuro:   Awake, alert.      Speech is normal and fluent.    Face is symmetric.     Moves all extremities    Emergency Department Course     Imaging:  Radiographic findings were communicated with the patient who voiced understanding of the findings.    CT Abdomen Pelvis w Contrast  1.  No acute abnormality seen in the abdomen or pelvis.  2.  Enlarged, heterogenous uterus with left-sided corpus luteal cyst.  3.  Moderate to large stool burden throughout the colon, most numerous  within the cecum and ascending colon.  As read by Radiology.     Laboratory:  Lipase: 35 (L)  CMP: Glucose 101 (H), urea nitrogen 6 (L), calcium 8.4 (L), o/w WNL (Creatinine: 0.68)  CBC: WBC 3.9 (L), HGB 14.5,      HCG qual: pending  UA: pending  Urine culture: pending     Interventions:  1449: NS 1L IV Bolus   1449: Zofran 4 mg IV  1450: Dilaudid 0.5 mg IV    Emergency Department Course:  1434: Nursing notes and vitals reviewed. I performed an exam of the patient as documented above.     IV inserted. Medicine administered as documented above. Blood drawn. This was sent to the lab for further testing, results above. The patient provided a urine sample here in the emergency department. This was sent for laboratory testing, findings above.      The patient was sent for an abdomen pelvis CT while in the emergency department, findings above.     1420: I rechecked the patient and discussed the results of her workup thus far.     Findings and plan explained to the Patient.  Patient discharged home with instructions regarding supportive care, medications, and reasons to return. The importance of close follow-up was reviewed.     I personally reviewed the laboratory results with the Patient and answered all related questions prior to discharge.     Impression & Plan      Medical Decision Making:  Renetta Sears is a 44 year old female who presents the emergency department with abdominal pain.  She had a cholecystectomy in 2004.  She had abdominal pain that started yesterday morning.  On exam she has tenderness mostly in the middle of her abdomen and right upper area.  Given prior cholecystectomy doubt cholecystitis as gallbladder is surgically absent.  Laboratory work-up was obtained which showed normal LFTs and lipase.  CT scan was performed to evaluate for cause of pain and fortunately was negative for any acute findings.  There was significant stool burden on the right side of the abdomen which could certainly cause the discomfort she has been experiencing.  She was given pain medications in the emergency department which did help with her symptoms but did not completely resolve it.  She discussed using pain medications at home.  Advised against any opiate medications due to risk of worsening constipation.  Suggested that she use MiraLAX to help with her bowels and hopefully that will help resolve her discomfort if she is able to relieve the large amount of stool in the right abdomen.  I also discussed with her the enlarged uterus that was heterogeneous on the CT scan.  Advised her to follow-up with her OB/GYN which she states is at [facility].  She verbalized understanding and she was discharged home.      Diagnosis:    ICD-10-CM    1. Abdominal pain, generalized R10.84    2. Constipation, unspecified constipation type K59.00    3. Enlarged uterus N85.2        Disposition:  discharged to home    Kiki AGUSTIN am serving as a scribe at 2:34 PM on  10/7/2019 to document services personally performed by Elvin Whiting MD based on my observations and the provider's statements to me.     Kiki Tian  10/7/2019    EMERGENCY DEPARTMENT       Elvin Whiting MD  10/07/19 6808

## 2019-10-07 NOTE — DISCHARGE INSTRUCTIONS
Discharge Instructions  Abdominal Pain    Abdominal pain (belly pain) can be caused by many things. Your evaluation today does not show the exact cause for your pain. Your provider today has decided that it is unlikely your pain is due to a life threatening problem, or a problem requiring surgery or hospital admission. Sometimes those problems cannot be found right away, so it is very important that you follow up as directed.  Sometimes only the changes which occur over time allow the cause of your pain to be found.    Generally, every Emergency Department visit should have a follow-up clinic visit with either a primary or a specialty clinic/provider. Please follow-up as instructed by your emergency provider today. With abdominal pain, we often recommend very close follow-up, such as the following day.    ADULTS:  Return to the Emergency Department right away if:    You get an oral temperature above 102oF or as directed by your provider.  You have blood in your stools. This may be bright red or appear as black, tarry stools.    You keep vomiting (throwing up) or cannot drink liquids.  You see blood when you vomit.   You cannot have a bowel movement or you cannot pass gas.  Your stomach gets bloated or bigger.  Your skin or the whites of your eyes look yellow.  You faint.  You have bloody, frequent or painful urination (peeing).  You have new symptoms or anything that worries you.    CHILDREN:  Return to the Emergency Department right away if your child has any of the above-listed symptoms or the following:    Pushes your hand away or screams/cries when his/her belly is touched.  You notice your child is very fussy or weak.  Your child is very tired and is too tired to eat or drink.  Your child is dehydrated.  Signs of dehydration can be:  Significant change in the amount of wet diapers/urine.  Your infant or child starts to have dry mouth and lips, or no saliva (spit) or tears.    PREGNANT WOMEN:  Return to the  Emergency Department right away if you have any of the above-listed symptoms or the following:    You have bleeding, leaking fluid or passing tissue from the vagina.  You have worse pain or cramping, or pain in your shoulder or back.  You have vomiting that will not stop.  You have a temperature of 100oF or more.  Your baby is not moving as much as usual.  You faint.  You get a bad headache with or without eye problems and abdominal pain.  You have a seizure.  You have unusual discharge from your vagina and abdominal pain.    Abdominal pain is pretty common during pregnancy.  Your pain may or may not be related to your pregnancy. You should follow-up closely with your OB provider so they can evaluate you and your baby.  Until you follow-up with your regular provider, do the following:     Avoid sex and do not put anything in your vagina.  Drink clear fluids.  Only take medications approved by your provider.    MORE INFORMATION:    Appendicitis:  A possible cause of abdominal pain in any person who still has their appendix is acute appendicitis. Appendicitis is often hard to diagnose.  Testing does not always rule out early appendicitis or other causes of abdominal pain. Close follow-up with your provider and re-evaluations may be needed to figure out the reason for your abdominal pain.    Follow-up:  It is very important that you make an appointment with your clinic and go to the appointment.  If you do not follow-up with your primary provider, it may result in missing an important development which could result in permanent injury or disability and/or lasting pain.  If there is any problem keeping your appointment, call your provider or return to the Emergency Department.    Medications:  Take your medications as directed by your provider today.  Before using over-the-counter medications, ask your provider and make sure to take the medications as directed.  If you have any questions about medications, ask your  "provider.    Diet:  Resume your normal diet as much as possible, but do not eat fried, fatty or spicy foods while you have pain.  Do not drink alcohol or have caffeine.  Do not smoke tobacco.    Probiotics: If you have been given an antibiotic, you may want to also take a probiotic pill or eat yogurt with live cultures. Probiotics have \"good bacteria\" to help your intestines stay healthy. Studies have shown that probiotics help prevent diarrhea (loose stools) and other intestine problems (including C. diff infection) when you take antibiotics. You can buy these without a prescription in the pharmacy section of the store.     If you were given a prescription for medicine here today, be sure to read all of the information (including the package insert) that comes with your prescription.  This will include important information about the medicine, its side effects, and any warnings that you need to know about.  The pharmacist who fills the prescription can provide more information and answer questions you may have about the medicine.  If you have questions or concerns that the pharmacist cannot address, please call or return to the Emergency Department.       Remember that you can always come back to the Emergency Department if you are not able to see your regular provider in the amount of time listed above, if you get any new symptoms, or if there is anything that worries you.       "

## 2019-10-07 NOTE — ED TRIAGE NOTES
Patient reports lower abdominal pain starting at 5am yesterday morning. Pain is increasing. Denies N/V/D.

## 2019-10-07 NOTE — LETTER
October 7, 2019      To Whom It May Concern:      Renetta Sears was seen in our Emergency Department today, 10/07/19.  I expect her condition to improve over the next 2 days.  She may return to work/school when improved.    Sincerely,        Verenice Stiles RN

## 2019-10-07 NOTE — ED AVS SNAPSHOT
Emergency Department  [address] [place] [address]  Phone:  [phone]  Fax:  [phone]                                    Renetta Sears   MRN: [id number]    Department:   Emergency Department   Date of Visit:  10/7/2019           After Visit Summary Signature Page    I have received my discharge instructions, and my questions have been answered. I have discussed any challenges I see with this plan with the nurse or doctor.    ..........................................................................................................................................  Patient/Patient Representative Signature      ..........................................................................................................................................  Patient Representative Print Name and Relationship to Patient    ..................................................               ................................................  Date                                   Time    ..........................................................................................................................................  Reviewed by Signature/Title    ...................................................              ..............................................  Date                                               Time          22EPIC Rev 08/18

## 2019-11-06 ENCOUNTER — HEALTH MAINTENANCE LETTER (OUTPATIENT)
Age: 44
End: 2019-11-06

## 2019-11-07 ASSESSMENT — MIFFLIN-ST. JEOR: SCORE: 1402.04

## 2019-11-11 ENCOUNTER — ANESTHESIA - HEALTHEAST (OUTPATIENT)
Dept: SURGERY | Facility: CLINIC | Age: 44
End: 2019-11-11

## 2019-11-12 ENCOUNTER — SURGERY - HEALTHEAST (OUTPATIENT)
Dept: SURGERY | Facility: CLINIC | Age: 44
End: 2019-11-12

## 2019-11-12 ASSESSMENT — MIFFLIN-ST. JEOR: SCORE: 1389.57

## 2019-11-25 ENCOUNTER — RECORDS - HEALTHEAST (OUTPATIENT)
Dept: ADMINISTRATIVE | Facility: OTHER | Age: 44
End: 2019-11-25

## 2019-11-25 ENCOUNTER — HOSPITAL ENCOUNTER (OUTPATIENT)
Dept: CT IMAGING | Facility: CLINIC | Age: 44
Discharge: HOME | End: 2019-11-25
Attending: OBSTETRICS & GYNECOLOGY

## 2019-11-25 DIAGNOSIS — R10.31 RIGHT LOWER QUADRANT PAIN: ICD-10-CM

## 2020-01-11 ENCOUNTER — HOSPITAL ENCOUNTER (EMERGENCY)
Facility: CLINIC | Age: 45
Discharge: HOME | End: 2020-01-11
Attending: EMERGENCY MEDICINE | Admitting: EMERGENCY MEDICINE
Payer: COMMERCIAL

## 2020-01-11 VITALS
DIASTOLIC BLOOD PRESSURE: 63 MMHG | OXYGEN SATURATION: 98 % | WEIGHT: 152 LBS | TEMPERATURE: 98.2 F | SYSTOLIC BLOOD PRESSURE: 123 MMHG | BODY MASS INDEX: 24.43 KG/M2 | HEIGHT: 66 IN | RESPIRATION RATE: 16 BRPM

## 2020-01-11 DIAGNOSIS — B96.89 BACTERIAL VAGINOSIS: ICD-10-CM

## 2020-01-11 DIAGNOSIS — N76.0 BACTERIAL VAGINOSIS: ICD-10-CM

## 2020-01-11 LAB
SPECIMEN SOURCE: ABNORMAL
WET PREP SPEC: ABNORMAL

## 2020-01-11 PROCEDURE — 87591 N.GONORRHOEAE DNA AMP PROB: CPT | Performed by: EMERGENCY MEDICINE

## 2020-01-11 PROCEDURE — 99283 EMERGENCY DEPT VISIT LOW MDM: CPT

## 2020-01-11 PROCEDURE — 87491 CHLMYD TRACH DNA AMP PROBE: CPT | Performed by: EMERGENCY MEDICINE

## 2020-01-11 PROCEDURE — 25000128 H RX IP 250 OP 636: Performed by: EMERGENCY MEDICINE

## 2020-01-11 PROCEDURE — 87210 SMEAR WET MOUNT SALINE/INK: CPT | Performed by: EMERGENCY MEDICINE

## 2020-01-11 PROCEDURE — 25000132 ZZH RX MED GY IP 250 OP 250 PS 637: Performed by: EMERGENCY MEDICINE

## 2020-01-11 RX ORDER — ONDANSETRON 4 MG/1
4 TABLET, ORALLY DISINTEGRATING ORAL ONCE
Status: COMPLETED | OUTPATIENT
Start: 2020-01-11 | End: 2020-01-11

## 2020-01-11 RX ORDER — METRONIDAZOLE 500 MG/1
500 TABLET ORAL 2 TIMES DAILY
Qty: 14 TABLET | Refills: 1 | Status: SHIPPED | OUTPATIENT
Start: 2020-01-11 | End: 2020-01-18

## 2020-01-11 RX ORDER — METRONIDAZOLE 500 MG/1
2000 TABLET ORAL ONCE
Status: COMPLETED | OUTPATIENT
Start: 2020-01-11 | End: 2020-01-11

## 2020-01-11 RX ORDER — ONDANSETRON 4 MG/1
4 TABLET, ORALLY DISINTEGRATING ORAL EVERY 8 HOURS PRN
Qty: 10 TABLET | Refills: 0 | Status: SHIPPED | OUTPATIENT
Start: 2020-01-11 | End: 2020-01-14

## 2020-01-11 RX ORDER — MICONAZOLE NITRATE 2 %
1 CREAM WITH APPLICATOR VAGINAL ONCE
Status: DISCONTINUED | OUTPATIENT
Start: 2020-01-11 | End: 2020-01-11 | Stop reason: HOSPADM

## 2020-01-11 RX ADMIN — METRONIDAZOLE 2000 MG: 500 TABLET ORAL at 07:47

## 2020-01-11 RX ADMIN — ONDANSETRON 4 MG: 4 TABLET, ORALLY DISINTEGRATING ORAL at 07:47

## 2020-01-11 ASSESSMENT — ENCOUNTER SYMPTOMS
FEVER: 0
ABDOMINAL PAIN: 0
COUGH: 0
SORE THROAT: 0

## 2020-01-11 ASSESSMENT — MIFFLIN-ST. JEOR: SCORE: 1366.22

## 2020-01-11 NOTE — ED AVS SNAPSHOT
Emergency Department  [address] [facility] [address]  Phone:  [phone]  Fax:  [phone]                                    Renetta Sears   MRN: [id number]    Department:   Emergency Department   Date of Visit:  1/11/2020           After Visit Summary Signature Page    I have received my discharge instructions, and my questions have been answered. I have discussed any challenges I see with this plan with the nurse or doctor.    ..........................................................................................................................................  Patient/Patient Representative Signature      ..........................................................................................................................................  Patient Representative Print Name and Relationship to Patient    ..................................................               ................................................  Date                                   Time    ..........................................................................................................................................  Reviewed by Signature/Title    ...................................................              ..............................................  Date                                               Time          22EPIC Rev 08/18

## 2020-01-11 NOTE — ED PROVIDER NOTES
History     Chief Complaint:  Vaginal Itching    HPI   Renetta Sears is a 44 year old female who presents to the emergency department today for evaluation of vaginal itching. The patient reports that she underwent a total laparoscopic hysterectomy, bilateral salpingectomy, and cystoscopy on 11/12/19, noting that she has been diagnosed with a yeast infection and bacterial vaginosis x2 since then. She explains that she had intercourse for the first time since her surgery one week ago, adding that this was pain free. The patient furthers that she woke this morning with vaginal irritation, itching, and discharge, prompting presentation. Here she denies any fever, recent illness, or abdominal pain.     Allergies:  Estrogens  Medroxyprogesterone     Medications:    Magnesium oxide    Past Medical History:    Syncope   Obesity  Anxiety  Chronic urethritis  Chronic headache  Moderate major depression  Acute cholecystitis   Esophagitis medicamentosa   Pulmonary embolism and infarction  Enlarged uterus  Concussion  Post concussive syndrome  Thyroid nodule  Seborrheic dermatitis  Vitamin D deficiency  Hidradenitis suppurativa  Hyperlipidemia   Varicella    Past Surgical History:    Tonsillectomy and adenoidectomy  Laparoscopic cholecystectomy  Tubal ligation  Total laparoscopic hysterectomy bilateral salpingectomy cystoscopy  Hernia repair     Family History:    Mother: arthritis, thyroid disease, depression, hyperlipidemia, diabetes  Father: diabetes, hypertension, depression, hyperlipidemia, mental disorder, rheumatoid arthritis    Social History:  The patient presented to the ED alone.  Smoking Status: Never Smoker  Smokeless Tobacco: Never Used  Alcohol Use: Positive  Drug Use: Negative  PCP: Clinic, [facility]  Marital Status:  Single    Review of Systems   Constitutional: Negative for fever.   HENT: Negative for congestion and sore throat.    Respiratory: Negative for cough.    Gastrointestinal:  "Negative for abdominal pain.   Genitourinary: Positive for vaginal discharge. Negative for dyspareunia.        Itching, irritation   All other systems reviewed and are negative.      Physical Exam     Patient Vitals for the past 24 hrs:   BP Temp Temp src Heart Rate Resp SpO2 Height Weight   01/11/20 0526 123/63 98.2  F (36.8  C) Oral 89 16 98 % 1.676 m (5' 6\") 68.9 kg (152 lb)     Physical Exam  Constitutional:  Alert, answers questions appropriately.   Neck:    Normal range of motion   HEENT:  Pupils round, equal  Pulmonary/Chest:  Effort normal.   Neurological:   No facial asymmetry, gait intact  Psychiatric:   The patient has a normal mood and affect.   :    Moderate amount of thin yellow discharge, no inflammation of the vaginal walls. Normal external female genitalia, cervix surgically absent  Abdomen:  No tenderness, no guarding or rebound, no masses, normal bowel sounds.    Emergency Department Course     Laboratory:  Laboratory findings were communicated with the patient who voiced understanding of the findings.    Wet Prep: Clue Cells seen (A), o/w WNL   Chlamydia Trachomatis PCR: pending  Neisseria Gonorrhea PCR: pending      Emergency Department Course:    0531 Nursing notes and vitals reviewed.    0540 I performed an exam of the patient as documented above.     0602 I performed a pelvic exam of the patient as documented above it the presence of a female chaperone. A vaginal sample was obtained for laboratory testing as documented above.    Findings and plan explained to the patient. Patient discharged home with instructions regarding supportive care, medications, and reasons to return. The importance of close follow-up was reviewed. The patient was prescribed flagyl and zofran.     Impression & Plan      Medical Decision Making:  Renetta Sears is a 44 year old female who presents with vaginal irritation, itching, and discharge as detailed above. Based on wet prep results, patient notified that she " has bacterial vaginosis which is a clinical syndrome resulting from replacement of the normal vaginal khang with anaerobic bacteria in high concentration. Patient was given a flagyl and zofran. She reported relief with a single dose of flagyl in the past. I wrote a prescription for a weeks course as well, if symptoms persist. Patient should return to the emergency department for fever, cramping, vomiting, or with other questions or concerns.     Diagnosis:    ICD-10-CM    1. Bacterial vaginosis N76.0     B96.89      Disposition:   The patient is discharged to home.    Discharge Medications:  New Prescriptions    METRONIDAZOLE (FLAGYL) 500 MG TABLET    Take 1 tablet (500 mg) by mouth 2 times daily for 7 days    ONDANSETRON (ZOFRAN ODT) 4 MG ODT TAB    Take 1 tablet (4 mg) by mouth every 8 hours as needed     Scribe Disclosure:  I, Neida Mora, am serving as a scribe at 6:06 AM on 1/11/2020 to document services personally performed by Cristobal Mckeon MD based on my observations and the provider's statements to me.     EMERGENCY DEPARTMENT       Cristobal Mckeon MD  01/13/20 0356

## 2020-01-12 NOTE — RESULT ENCOUNTER NOTE
Final result for both N. Gonorrhoeae PCR and Chlamydia Trachomatis PCR are NEGATIVE.  No treatment or change in treatment per [facility] ED Lab Result protocol.

## 2020-05-22 ENCOUNTER — HOSPITAL ENCOUNTER (EMERGENCY)
Facility: CLINIC | Age: 45
Discharge: HOME | End: 2020-05-22
Attending: EMERGENCY MEDICINE | Admitting: EMERGENCY MEDICINE
Payer: COMMERCIAL

## 2020-05-22 VITALS
SYSTOLIC BLOOD PRESSURE: 129 MMHG | WEIGHT: 150 LBS | HEART RATE: 84 BPM | BODY MASS INDEX: 24.11 KG/M2 | HEIGHT: 66 IN | OXYGEN SATURATION: 98 % | TEMPERATURE: 98.6 F | DIASTOLIC BLOOD PRESSURE: 81 MMHG

## 2020-05-22 DIAGNOSIS — N76.0 BACTERIAL VAGINOSIS: ICD-10-CM

## 2020-05-22 DIAGNOSIS — B96.89 BACTERIAL VAGINOSIS: ICD-10-CM

## 2020-05-22 LAB
ALBUMIN UR-MCNC: NEGATIVE MG/DL
APPEARANCE UR: CLEAR
BILIRUB UR QL STRIP: NEGATIVE
COLOR UR AUTO: ABNORMAL
GLUCOSE UR STRIP-MCNC: NEGATIVE MG/DL
HGB UR QL STRIP: NEGATIVE
HYALINE CASTS #/AREA URNS LPF: 3 /LPF (ref 0–2)
KETONES UR STRIP-MCNC: NEGATIVE MG/DL
LEUKOCYTE ESTERASE UR QL STRIP: ABNORMAL
MUCOUS THREADS #/AREA URNS LPF: PRESENT /LPF
NITRATE UR QL: NEGATIVE
PH UR STRIP: 5.5 PH (ref 5–7)
RBC #/AREA URNS AUTO: 1 /HPF (ref 0–2)
SOURCE: ABNORMAL
SP GR UR STRIP: 1.01 (ref 1–1.03)
SPECIMEN SOURCE: ABNORMAL
SQUAMOUS #/AREA URNS AUTO: 3 /HPF (ref 0–1)
UROBILINOGEN UR STRIP-MCNC: NORMAL MG/DL (ref 0–2)
WBC #/AREA URNS AUTO: 1 /HPF (ref 0–5)
WET PREP SPEC: ABNORMAL

## 2020-05-22 PROCEDURE — 99283 EMERGENCY DEPT VISIT LOW MDM: CPT

## 2020-05-22 PROCEDURE — 87591 N.GONORRHOEAE DNA AMP PROB: CPT | Performed by: EMERGENCY MEDICINE

## 2020-05-22 PROCEDURE — 87210 SMEAR WET MOUNT SALINE/INK: CPT | Performed by: EMERGENCY MEDICINE

## 2020-05-22 PROCEDURE — 87491 CHLMYD TRACH DNA AMP PROBE: CPT | Performed by: EMERGENCY MEDICINE

## 2020-05-22 PROCEDURE — 81001 URINALYSIS AUTO W/SCOPE: CPT | Performed by: EMERGENCY MEDICINE

## 2020-05-22 PROCEDURE — 87661 TRICHOMONAS VAGINALIS AMPLIF: CPT | Performed by: EMERGENCY MEDICINE

## 2020-05-22 RX ORDER — METRONIDAZOLE 500 MG/1
500 TABLET ORAL 2 TIMES DAILY
Qty: 14 TABLET | Refills: 0 | Status: SHIPPED | OUTPATIENT
Start: 2020-05-22 | End: 2020-05-29

## 2020-05-22 ASSESSMENT — ENCOUNTER SYMPTOMS
HEMATURIA: 0
VOMITING: 0
DYSURIA: 0
FEVER: 0
ABDOMINAL PAIN: 0
NAUSEA: 0
COUGH: 0

## 2020-05-22 ASSESSMENT — MIFFLIN-ST. JEOR: SCORE: 1357.15

## 2020-05-22 NOTE — ED NOTES
Witnessed MD perform pelvic exam. Could not immediately send down samples since te doctor had not put in an order for them. Once the doctor put in the orders for the samples to be sent to lab writer went back into room to label and send samples. Patient became agitated that the samples were not immediately sent. Writer apologized and explained that the doctor had not put in to orders so that they could not be sent to lab yet. Patient became verbally hostile and expressed dissatisfaction with her care. Nurse and MD aware and lab was called to expedite patients labs.

## 2020-05-22 NOTE — DISCHARGE INSTRUCTIONS
*You may resume diet and activities.  *Take medications as prescribed.  Continue your current medications  *Follow-up with your doctor in the next week for ongoing symptoms.  *Return if you develop abdominal pain,fever or become worse in any way.

## 2020-05-22 NOTE — ED AVS SNAPSHOT
Emergency Department  [address] [facility] [address]  Phone:  [phone]  Fax:  [phone]                                    Renetta Sears   MRN: [id number]    Department:   Emergency Department   Date of Visit:  5/22/2020           After Visit Summary Signature Page    I have received my discharge instructions, and my questions have been answered. I have discussed any challenges I see with this plan with the nurse or doctor.    ..........................................................................................................................................  Patient/Patient Representative Signature      ..........................................................................................................................................  Patient Representative Print Name and Relationship to Patient    ..................................................               ................................................  Date                                   Time    ..........................................................................................................................................  Reviewed by Signature/Title    ...................................................              ..............................................  Date                                               Time          22EPIC Rev 08/18

## 2020-05-22 NOTE — ED PROVIDER NOTES
History   Chief Complaint:  Vaginal Discharge    HPI   Renetta Sears is a 44 year old female, who presents to the ED for evaluation of vaginal discharge. The patient reports the onset of a cream colored foul smelling discharged yesterday evening. She sates she did not truly notice the discharge until about an hour ago when she went to use the restroom, so she comes in today to be evaluated. She mentions the discharge has the same symptoms and presentations as a past trichomonas infection. She last had the infection about three months ago was treated, and cleared of the infection. She denies any new sexual partners and says her partner was treated during the same time course as her last time. The patient denies any exposure to anyone with COVID like symptoms or interacting with any ill individuals. She also denies any fever, cough, nausea, vomiting, abdominal pain, dysuria, hematuria, or any other acute symptoms.  Of note, she reports that she had a hysterectomy last year and has no cervix.    Allergies:  Estrogens  Medroxyprogesterone    Medications:    Robaxin    Past Medical History:    Cholecystitis  Depression  Esophagitis Medicamentosa  Pulmonary embolism   DVT    Past Surgical History:    Tonsillectomy  Adenoidectomy  Cholecystectomy  Tubal ligation    Family History:    Arthritis  Thyroid disease    Social History:  Smoking status: Never  Alcohol use: Yes  Drug use: Never  PCP: [facility]  Marital Status:  Single [1]    Review of Systems   Constitutional: Negative for fever.   Respiratory: Negative for cough.    Gastrointestinal: Negative for abdominal pain, nausea and vomiting.   Genitourinary: Positive for vaginal discharge. Negative for dysuria and hematuria.   All other systems reviewed and are negative.    Physical Exam     Patient Vitals for the past 24 hrs:   BP Temp Temp src Pulse SpO2 Height Weight   05/22/20 0327 -- -- -- -- 98 % -- --   05/22/20 0326 129/81 98.6  F (37  C) Oral 84 97 %  "1.676 m (5' 6\") 68 kg (150 lb)     Physical Exam  General: Well-nourished, appears to be resting comfortably when I enter the room  Eyes: PERRL, conjunctivae pink no scleral icterus or conjunctival injection  ENT:  Moist mucus membranes, posterior oropharynx clear without erythema or exudates  Respiratory:  Lungs clear to auscultation bilaterally, no crackles/rubs/wheezes.  Good air movement  CV: Normal rate and rhythm, no murmurs/rubs/gallops  GI:  Abdomen soft and non-distended.  Normoactive BS.  No tenderness, guarding or rebound  : Scant white discharge. No lesions on external exam.  Cuff intact.  Skin: Warm, dry.  No rashes or petechiae  Musculoskeletal: No peripheral edema or calf tenderness  Neuro: Alert and oriented to person/place/time  Psychiatric: Normal affect      Emergency Department Course   Laboratory:  Laboratory findings were communicated with the patient who voiced understanding of the findings.    Wet Prep: Clue Cells    UA: Leukocyte Esterase Trace, Squamous Epithelial/HPF 3 (H), Mucous Present, Hyaline Casts 3 (H), o/w Negative    Chlamydia trachomatis PCR: Pending   Neisseria gonorrhoea PCR: Pending   Trichomonas vaginalis DNA PCR: Pending    Emergency Department Course:  Past medical records, nursing notes, and vitals reviewed.    0334 I performed an exam of the patient as documented above.     0343    I performed the pelvic exam with a female chaperone in the room.     The patient provided a urine sample here in the emergency department. This was sent for laboratory testing, findings above.    0510 I rechecked the patient and discussed the results of her workup thus far.     Findings and plan explained to the Patient. Patient discharged home with instructions regarding supportive care, medications, and reasons to return. The importance of close follow-up was reviewed.    I personally reviewed the laboratory results with the Patient and answered all related questions prior to discharge. "     Impression & Plan   Medical Decision Making:  Renetta Sears is a 44 year old female, who presents with vaginal discharge and concerns about the possibility of a recurrence of trichomonas which had been fully treated with antibiotics.  No signs of trichomonas on her wet prep or her urinalysis.  She does not appear to have a urinary tract infection.  Wet prep did show clue cells and given the change in her discharge, we will treat her for bacterial vaginosis with a 7 day course of Flagyl.  No abdominal pain or other concerning findings on physical examination.  At this point I feel she safe for discharge home.    Diagnosis:    ICD-10-CM    1. Bacterial vaginosis  N76.0     B96.89      Disposition:  Discharged to home.    Discharge Medications:  New Prescriptions    METRONIDAZOLE (FLAGYL) 500 MG TABLET    Take 1 tablet (500 mg) by mouth 2 times daily for 7 days   Scribe Disclosure:  Vlad AGUSTIN, am serving as a scribe at 3:34 AM on 5/22/2020 to document services personally performed by Claire Martinez MD based on my observations and the provider's statements to me.        Claire Martinez MD  05/22/20 0523

## 2020-05-22 NOTE — ED NOTES
Expressed dissatisfaction with the wait for samples being sent to the lab.  Called lab to have samples expedited due to pt's previous wait.

## 2020-08-07 ENCOUNTER — HOSPITAL ENCOUNTER (EMERGENCY)
Facility: CLINIC | Age: 45
Discharge: HOME | End: 2020-08-07
Attending: EMERGENCY MEDICINE | Admitting: EMERGENCY MEDICINE
Payer: COMMERCIAL

## 2020-08-07 VITALS
OXYGEN SATURATION: 100 % | TEMPERATURE: 98.1 F | SYSTOLIC BLOOD PRESSURE: 114 MMHG | RESPIRATION RATE: 20 BRPM | HEART RATE: 99 BPM | DIASTOLIC BLOOD PRESSURE: 78 MMHG

## 2020-08-07 DIAGNOSIS — R11.0 NAUSEA: ICD-10-CM

## 2020-08-07 DIAGNOSIS — R20.2 PARESTHESIAS: ICD-10-CM

## 2020-08-07 DIAGNOSIS — R55 NEAR SYNCOPE: ICD-10-CM

## 2020-08-07 LAB
ALBUMIN SERPL-MCNC: 3.6 G/DL (ref 3.4–5)
ALP SERPL-CCNC: 48 U/L (ref 40–150)
ALT SERPL W P-5'-P-CCNC: 25 U/L (ref 0–50)
ANION GAP SERPL CALCULATED.3IONS-SCNC: 4 MMOL/L (ref 3–14)
AST SERPL W P-5'-P-CCNC: 13 U/L (ref 0–45)
BASOPHILS # BLD AUTO: 0 10E9/L (ref 0–0.2)
BASOPHILS NFR BLD AUTO: 0.3 %
BILIRUB SERPL-MCNC: 1.1 MG/DL (ref 0.2–1.3)
BUN SERPL-MCNC: 12 MG/DL (ref 7–30)
CALCIUM SERPL-MCNC: 8.7 MG/DL (ref 8.5–10.1)
CHLORIDE SERPL-SCNC: 106 MMOL/L (ref 94–109)
CO2 SERPL-SCNC: 26 MMOL/L (ref 20–32)
CREAT SERPL-MCNC: 0.91 MG/DL (ref 0.52–1.04)
DIFFERENTIAL METHOD BLD: NORMAL
EOSINOPHIL # BLD AUTO: 0.1 10E9/L (ref 0–0.7)
EOSINOPHIL NFR BLD AUTO: 1.8 %
ERYTHROCYTE [DISTWIDTH] IN BLOOD BY AUTOMATED COUNT: 12.5 % (ref 10–15)
GFR SERPL CREATININE-BSD FRML MDRD: 78 ML/MIN/{1.73_M2}
GLUCOSE SERPL-MCNC: 119 MG/DL (ref 70–99)
HCT VFR BLD AUTO: 39.9 % (ref 35–47)
HGB BLD-MCNC: 13.4 G/DL (ref 11.7–15.7)
IMM GRANULOCYTES # BLD: 0 10E9/L (ref 0–0.4)
IMM GRANULOCYTES NFR BLD: 0.3 %
INTERPRETATION ECG - MUSE: NORMAL
LIPASE SERPL-CCNC: 38 U/L (ref 73–393)
LYMPHOCYTES # BLD AUTO: 2.1 10E9/L (ref 0.8–5.3)
LYMPHOCYTES NFR BLD AUTO: 34 %
MCH RBC QN AUTO: 31.3 PG (ref 26.5–33)
MCHC RBC AUTO-ENTMCNC: 33.6 G/DL (ref 31.5–36.5)
MCV RBC AUTO: 93 FL (ref 78–100)
MONOCYTES # BLD AUTO: 0.5 10E9/L (ref 0–1.3)
MONOCYTES NFR BLD AUTO: 7.4 %
NEUTROPHILS # BLD AUTO: 3.5 10E9/L (ref 1.6–8.3)
NEUTROPHILS NFR BLD AUTO: 56.2 %
NRBC # BLD AUTO: 0 10*3/UL
NRBC BLD AUTO-RTO: 0 /100
PLATELET # BLD AUTO: 216 10E9/L (ref 150–450)
POTASSIUM SERPL-SCNC: 3.7 MMOL/L (ref 3.4–5.3)
PROT SERPL-MCNC: 6.8 G/DL (ref 6.8–8.8)
RBC # BLD AUTO: 4.28 10E12/L (ref 3.8–5.2)
SODIUM SERPL-SCNC: 136 MMOL/L (ref 133–144)
TROPONIN I SERPL-MCNC: <0.015 UG/L (ref 0–0.04)
WBC # BLD AUTO: 6.2 10E9/L (ref 4–11)

## 2020-08-07 PROCEDURE — 96361 HYDRATE IV INFUSION ADD-ON: CPT

## 2020-08-07 PROCEDURE — 96374 THER/PROPH/DIAG INJ IV PUSH: CPT

## 2020-08-07 PROCEDURE — 83690 ASSAY OF LIPASE: CPT | Performed by: EMERGENCY MEDICINE

## 2020-08-07 PROCEDURE — 99284 EMERGENCY DEPT VISIT MOD MDM: CPT | Mod: 25

## 2020-08-07 PROCEDURE — 85025 COMPLETE CBC W/AUTO DIFF WBC: CPT | Performed by: EMERGENCY MEDICINE

## 2020-08-07 PROCEDURE — 84484 ASSAY OF TROPONIN QUANT: CPT | Performed by: EMERGENCY MEDICINE

## 2020-08-07 PROCEDURE — 25800030 ZZH RX IP 258 OP 636: Performed by: EMERGENCY MEDICINE

## 2020-08-07 PROCEDURE — 25000128 H RX IP 250 OP 636: Performed by: EMERGENCY MEDICINE

## 2020-08-07 PROCEDURE — 80053 COMPREHEN METABOLIC PANEL: CPT | Performed by: EMERGENCY MEDICINE

## 2020-08-07 PROCEDURE — 93005 ELECTROCARDIOGRAM TRACING: CPT

## 2020-08-07 RX ORDER — SODIUM CHLORIDE 9 MG/ML
INJECTION, SOLUTION INTRAVENOUS CONTINUOUS
Status: DISCONTINUED | OUTPATIENT
Start: 2020-08-07 | End: 2020-08-07 | Stop reason: HOSPADM

## 2020-08-07 RX ORDER — KETOROLAC TROMETHAMINE 15 MG/ML
15 INJECTION, SOLUTION INTRAMUSCULAR; INTRAVENOUS ONCE
Status: COMPLETED | OUTPATIENT
Start: 2020-08-07 | End: 2020-08-07

## 2020-08-07 RX ADMIN — SODIUM CHLORIDE 1000 ML: 9 INJECTION, SOLUTION INTRAVENOUS at 02:51

## 2020-08-07 RX ADMIN — KETOROLAC TROMETHAMINE 15 MG: 15 INJECTION, SOLUTION INTRAMUSCULAR; INTRAVENOUS at 02:51

## 2020-08-07 ASSESSMENT — ENCOUNTER SYMPTOMS
FEVER: 0
SHORTNESS OF BREATH: 0
DIZZINESS: 1
NAUSEA: 1
COUGH: 0
DIARRHEA: 0
LIGHT-HEADEDNESS: 1
HEMATURIA: 0
ABDOMINAL PAIN: 1
DYSURIA: 0
CHILLS: 1
FATIGUE: 1

## 2020-08-07 NOTE — ED AVS SNAPSHOT
Emergency Department  [address] [place] [address]  Phone:  [phone]  Fax:  [phone]                                    Renetta Sears   MRN: [id number]    Department:   Emergency Department   Date of Visit:  8/7/2020           After Visit Summary Signature Page    I have received my discharge instructions, and my questions have been answered. I have discussed any challenges I see with this plan with the nurse or doctor.    ..........................................................................................................................................  Patient/Patient Representative Signature      ..........................................................................................................................................  Patient Representative Print Name and Relationship to Patient    ..................................................               ................................................  Date                                   Time    ..........................................................................................................................................  Reviewed by Signature/Title    ...................................................              ..............................................  Date                                               Time          22EPIC Rev 08/18

## 2020-08-07 NOTE — ED TRIAGE NOTES
Took a nap this evening.  Started cooking, arms started feeling heavy.  Became nauseous, hot and cold, and dizzy.  Sat down and continued to have waves of dizziness. Legs and arms feel heavy at this time.

## 2020-09-01 ENCOUNTER — APPOINTMENT (OUTPATIENT)
Dept: GENERAL RADIOLOGY | Facility: CLINIC | Age: 45
End: 2020-09-01
Attending: NURSE PRACTITIONER
Payer: COMMERCIAL

## 2020-09-01 ENCOUNTER — HOSPITAL ENCOUNTER (EMERGENCY)
Facility: CLINIC | Age: 45
Discharge: HOME | End: 2020-09-01
Attending: NURSE PRACTITIONER | Admitting: NURSE PRACTITIONER
Payer: COMMERCIAL

## 2020-09-01 VITALS
HEART RATE: 80 BPM | DIASTOLIC BLOOD PRESSURE: 62 MMHG | WEIGHT: 150 LBS | RESPIRATION RATE: 16 BRPM | TEMPERATURE: 98.3 F | HEIGHT: 66 IN | BODY MASS INDEX: 24.11 KG/M2 | OXYGEN SATURATION: 100 % | SYSTOLIC BLOOD PRESSURE: 116 MMHG

## 2020-09-01 DIAGNOSIS — M54.50 ACUTE BILATERAL LOW BACK PAIN WITHOUT SCIATICA: ICD-10-CM
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
## 2020-09-01 DIAGNOSIS — S16.1XXA STRAIN OF NECK MUSCLE, INITIAL ENCOUNTER: ICD-10-CM

## 2020-09-01 PROCEDURE — 99283 EMERGENCY DEPT VISIT LOW MDM: CPT

## 2020-09-01 PROCEDURE — 72040 X-RAY EXAM NECK SPINE 2-3 VW: CPT

## 2020-09-01 RX ORDER — IBUPROFEN 600 MG/1
600 TABLET, FILM COATED ORAL EVERY 6 HOURS PRN
Qty: 40 TABLET | Refills: 0 | Status: SHIPPED | OUTPATIENT
Start: 2020-09-01

## 2020-09-01 RX ORDER — CYCLOBENZAPRINE HCL 10 MG
10 TABLET ORAL 3 TIMES DAILY PRN
Qty: 21 TABLET | Refills: 0 | Status: SHIPPED | OUTPATIENT
Start: 2020-09-01 | End: 2020-09-29

## 2020-09-01 ASSESSMENT — ENCOUNTER SYMPTOMS
HEMATURIA: 0
WEAKNESS: 0
NUMBNESS: 0
NECK PAIN: 1
FREQUENCY: 0
BACK PAIN: 1
DYSURIA: 0

## 2020-09-01 ASSESSMENT — MIFFLIN-ST. JEOR: SCORE: 1352.15

## 2020-09-01 NOTE — ED AVS SNAPSHOT
Emergency Department  [address] [facility] [address]  Phone:  [phone]  Fax:  [phone]                                    Renetta Sears   MRN: [id number]    Department:   Emergency Department   Date of Visit:  9/1/2020           After Visit Summary Signature Page    I have received my discharge instructions, and my questions have been answered. I have discussed any challenges I see with this plan with the nurse or doctor.    ..........................................................................................................................................  Patient/Patient Representative Signature      ..........................................................................................................................................  Patient Representative Print Name and Relationship to Patient    ..................................................               ................................................  Date                                   Time    ..........................................................................................................................................  Reviewed by Signature/Title    ...................................................              ..............................................  Date                                               Time          22EPIC Rev 08/18
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
## 2020-09-02 NOTE — DISCHARGE INSTRUCTIONS
You can take Ibuprofen 600mg with food every 6 hours as needed for pain. Flexeril (muscle relaxer) as needed.

## 2020-09-29 ENCOUNTER — HOSPITAL ENCOUNTER (EMERGENCY)
Facility: CLINIC | Age: 45
Discharge: HOME | End: 2020-09-30
Attending: EMERGENCY MEDICINE | Admitting: EMERGENCY MEDICINE
Payer: COMMERCIAL

## 2020-09-29 VITALS
BODY MASS INDEX: 24.21 KG/M2 | HEART RATE: 91 BPM | TEMPERATURE: 98.6 F | SYSTOLIC BLOOD PRESSURE: 130 MMHG | OXYGEN SATURATION: 97 % | DIASTOLIC BLOOD PRESSURE: 74 MMHG | RESPIRATION RATE: 15 BRPM | HEIGHT: 66 IN

## 2020-09-29 DIAGNOSIS — L50.9 URTICARIA: ICD-10-CM

## 2020-09-29 LAB
ALBUMIN SERPL-MCNC: 3.5 G/DL (ref 3.4–5)
ALP SERPL-CCNC: 54 U/L (ref 40–150)
ALT SERPL W P-5'-P-CCNC: 31 U/L (ref 0–50)
ANION GAP SERPL CALCULATED.3IONS-SCNC: 4 MMOL/L (ref 3–14)
AST SERPL W P-5'-P-CCNC: 15 U/L (ref 0–45)
BASOPHILS # BLD AUTO: 0 10E9/L (ref 0–0.2)
BASOPHILS NFR BLD AUTO: 0.2 %
BILIRUB SERPL-MCNC: 0.7 MG/DL (ref 0.2–1.3)
BUN SERPL-MCNC: 14 MG/DL (ref 7–30)
CALCIUM SERPL-MCNC: 8.4 MG/DL (ref 8.5–10.1)
CHLORIDE SERPL-SCNC: 106 MMOL/L (ref 94–109)
CO2 SERPL-SCNC: 27 MMOL/L (ref 20–32)
CREAT SERPL-MCNC: 0.74 MG/DL (ref 0.52–1.04)
DIFFERENTIAL METHOD BLD: ABNORMAL
EOSINOPHIL # BLD AUTO: 0.1 10E9/L (ref 0–0.7)
EOSINOPHIL NFR BLD AUTO: 0.4 %
ERYTHROCYTE [DISTWIDTH] IN BLOOD BY AUTOMATED COUNT: 13 % (ref 10–15)
GFR SERPL CREATININE-BSD FRML MDRD: >90 ML/MIN/{1.73_M2}
GLUCOSE SERPL-MCNC: 101 MG/DL (ref 70–99)
HCT VFR BLD AUTO: 40 % (ref 35–47)
HGB BLD-MCNC: 13.7 G/DL (ref 11.7–15.7)
IMM GRANULOCYTES # BLD: 0.1 10E9/L (ref 0–0.4)
IMM GRANULOCYTES NFR BLD: 0.4 %
LYMPHOCYTES # BLD AUTO: 3.5 10E9/L (ref 0.8–5.3)
LYMPHOCYTES NFR BLD AUTO: 24.7 %
MCH RBC QN AUTO: 32.2 PG (ref 26.5–33)
MCHC RBC AUTO-ENTMCNC: 34.3 G/DL (ref 31.5–36.5)
MCV RBC AUTO: 94 FL (ref 78–100)
MONOCYTES # BLD AUTO: 1.2 10E9/L (ref 0–1.3)
MONOCYTES NFR BLD AUTO: 8.4 %
NEUTROPHILS # BLD AUTO: 9.2 10E9/L (ref 1.6–8.3)
NEUTROPHILS NFR BLD AUTO: 65.9 %
NRBC # BLD AUTO: 0 10*3/UL
NRBC BLD AUTO-RTO: 0 /100
PLATELET # BLD AUTO: 203 10E9/L (ref 150–450)
POTASSIUM SERPL-SCNC: 3.6 MMOL/L (ref 3.4–5.3)
PROT SERPL-MCNC: 6.8 G/DL (ref 6.8–8.8)
RBC # BLD AUTO: 4.26 10E12/L (ref 3.8–5.2)
SODIUM SERPL-SCNC: 137 MMOL/L (ref 133–144)
WBC # BLD AUTO: 14 10E9/L (ref 4–11)

## 2020-09-29 PROCEDURE — 85025 COMPLETE CBC W/AUTO DIFF WBC: CPT | Performed by: EMERGENCY MEDICINE

## 2020-09-29 PROCEDURE — 96374 THER/PROPH/DIAG INJ IV PUSH: CPT

## 2020-09-29 PROCEDURE — 25000128 H RX IP 250 OP 636: Performed by: EMERGENCY MEDICINE

## 2020-09-29 PROCEDURE — 25000125 ZZHC RX 250: Performed by: EMERGENCY MEDICINE

## 2020-09-29 PROCEDURE — 96372 THER/PROPH/DIAG INJ SC/IM: CPT

## 2020-09-29 PROCEDURE — 80053 COMPREHEN METABOLIC PANEL: CPT | Performed by: EMERGENCY MEDICINE

## 2020-09-29 PROCEDURE — 96375 TX/PRO/DX INJ NEW DRUG ADDON: CPT

## 2020-09-29 PROCEDURE — 99285 EMERGENCY DEPT VISIT HI MDM: CPT | Mod: 25

## 2020-09-29 RX ORDER — FAMOTIDINE 20 MG/1
20 TABLET, FILM COATED ORAL 2 TIMES DAILY
Qty: 20 TABLET | Refills: 0 | Status: SHIPPED | OUTPATIENT
Start: 2020-09-29 | End: 2020-10-09

## 2020-09-29 RX ORDER — DIPHENHYDRAMINE HYDROCHLORIDE 50 MG/ML
25 INJECTION INTRAMUSCULAR; INTRAVENOUS ONCE
Status: COMPLETED | OUTPATIENT
Start: 2020-09-29 | End: 2020-09-29

## 2020-09-29 RX ORDER — HYDROXYZINE HYDROCHLORIDE 25 MG/1
25 TABLET, FILM COATED ORAL 3 TIMES DAILY PRN
COMMUNITY

## 2020-09-29 RX ORDER — CETIRIZINE HYDROCHLORIDE 10 MG/1
10 TABLET ORAL 2 TIMES DAILY PRN
Qty: 20 TABLET | Refills: 0 | Status: SHIPPED | OUTPATIENT
Start: 2020-09-29 | End: 2020-10-09

## 2020-09-29 RX ORDER — METHYLPREDNISOLONE 4 MG
TABLET, DOSE PACK ORAL
Qty: 21 TABLET | Refills: 0 | Status: SHIPPED | OUTPATIENT
Start: 2020-09-29

## 2020-09-29 RX ADMIN — EPINEPHRINE 0.3 MG: 1 INJECTION INTRAMUSCULAR; INTRAVENOUS; SUBCUTANEOUS at 23:06

## 2020-09-29 RX ADMIN — DIPHENHYDRAMINE HYDROCHLORIDE 25 MG: 50 INJECTION, SOLUTION INTRAMUSCULAR; INTRAVENOUS at 22:52

## 2020-09-29 RX ADMIN — FAMOTIDINE 20 MG: 10 INJECTION, SOLUTION INTRAVENOUS at 22:47

## 2020-09-29 NOTE — ED AVS SNAPSHOT
Emergency Department  [address] [place] [id number]  Phone:  [phone]  Fax:  [phone]                                    Renetta Sears   MRN: [id number]    Department:   Emergency Department   Date of Visit:  9/29/2020           After Visit Summary Signature Page    I have received my discharge instructions, and my questions have been answered. I have discussed any challenges I see with this plan with the nurse or doctor.    ..........................................................................................................................................  Patient/Patient Representative Signature      ..........................................................................................................................................  Patient Representative Print Name and Relationship to Patient    ..................................................               ................................................  Date                                   Time    ..........................................................................................................................................  Reviewed by Signature/Title    ...................................................              ..............................................  Date                                               Time          22EPIC Rev 08/18

## 2020-09-30 ASSESSMENT — ENCOUNTER SYMPTOMS
SHORTNESS OF BREATH: 0
FACIAL SWELLING: 0
VOMITING: 0
TROUBLE SWALLOWING: 0
FEVER: 0
NAUSEA: 1
FATIGUE: 1

## 2020-09-30 NOTE — ED TRIAGE NOTES
Pt reports itching and rash for the past 2 weeks, MD gave a medication but it is not getting better.

## 2020-09-30 NOTE — ED PROVIDER NOTES
"  History     Chief Complaint:  Rash and Pruritis    HPI   Renetta Sears is a 45 year old female with pertinent medical history of anxiety, pulmonary embolism who presents to the emergency department for evaluation of rash and pruritis. The patient reports the sudden onset of hives, 9 days ago. She went to Urgent Care 9/21 who stated it was hives, denied likelihood of shingles. The patient had a video call with her PCP who prescribed Atarax and Prednisone which the patient has been taking. She was also given urgent referral to Allergy. The patient presents to the ED for worsening rash that is \"unbearable.\" She states that she has been trying Benadryl, oatmeal baths and attempted other remedies but has had no relief. She has not slept in over 24 hours due to persistent itching and global burning sensation. She has associated fatigue and slight nausea. She denies any difficulty breathing, swallowing or rash in her mouth. She denies any history of similar rash and cannot think of any new foods, clothes, or medications that may be the cause of current rash.    Allergies:  Estrogens  Medroxyprogesterone     Medications:    Atarax   Prednisone    Lexapro   Spironolactone   Elavil   Flexeril     Past Medical History:    Depression   Esophagitis medicamentosa  Pulmonary edema  Pulmonary embolism  Anxiety  Chronic urethritis   Obesity   Syncope  Urethral stricture  Hyperlipidemia      Past Surgical History:    Tonsillectomy and adenoidectomy    Cholecystectomy, lap   Tubal ligation   Hysterectomy, lap  Hernia surgery    Family History:    Arthritis - mother   Thyroid disease - mother   Diabetes - father   Hypertension - father     Social History:  The patient presented to the ED alone.  Smoking Status: Never Smoker  Smokeless Tobacco: Never Used  Alcohol Use: Positive  Drug Use: Never  Marital Status:  Single     Review of Systems   Constitutional: Positive for fatigue. Negative for fever.   HENT: Negative for facial " "swelling, mouth sores and trouble swallowing.    Respiratory: Negative for shortness of breath.    Gastrointestinal: Positive for nausea. Negative for vomiting.   Skin: Positive for rash.   All other systems reviewed and are negative.        Physical Exam     Patient Vitals for the past 24 hrs:   BP Temp Temp src Pulse Resp SpO2 Height   09/29/20 2221 118/67 98.6  F (37  C) Oral 85 16 98 % 1.676 m (5' 6\")       Physical Exam    General: Alert, interactive in mild distress  Head:  Scalp is atraumatic  Eyes:  The pupils are equal, round, and reactive to light    EOM's intact    No scleral icterus  ENT:      Nose:  The external nose is normal  Ears:  External ears are normal  Mouth/Throat: The oropharynx is normal    Mucus membranes are moist    No angioedema  Neck:  Normal range of motion.      There is no rigidity.    Trachea is in the midline         CV:  Regular rate and rhythm    No murmur   Resp:  Breath sounds are clear bilaterally    Non-labored, no retractions or accessory muscle use      GI:  No distension       MS:  Normal strength in all 4 extremities  Skin:  Warm and dry.    Diffuse urticarial hives with surrounding excoriations. No signs of secondary infection.  Neuro: Strength 5/5 x4.    Psych:  Awake. Alert.  Mildly anxious affect.      Appropriate interactions.    Emergency Department Course     Laboratory:  Laboratory findings were communicated with the patient who voiced understanding of the findings.    CBC: WBC 14.0 (H), HGB 13.7,     CMP: Glucose 101 (H), Calcium 8.4 (L) o/w WNL (Creatinine 0.74)    Interventions:  2247 Pepcid, 20 mg, IV   2252 Benadryl, 25 mg, IV   2306 Epinephrine, 0.3 mg, IM    Emergency Department Course:     Nursing notes and vitals reviewed.    2222 I performed an exam of the patient as documented above.     2245 IV was inserted and blood was drawn for laboratory testing, results above.    Patient rechecked and updated.     2331 Findings and plan explained to the " Patient. Patient discharged home with instructions regarding supportive care, medications, and reasons to return. The importance of close follow-up was reviewed. The patient was prescribed medications listed below.      Impression & Plan      Medical Decision Making:  Renetta Sears was seen and evaluated. Findings are consistent with acute urticarial hives. It is unclear why these have developed. She received epinephrine as well as antihistamines as noted above with significant improvement of her symptoms. She was monitored for approximately 2 hours, had no recurrence of her symptoms, was feeling much improved and felt comfortable with discharge to home. I placed her on an extended burst and taper of steroids as well as antihistamines. I recommended that she keep the appointment set up by her primary care provider with an allergist as I think this is likely allergic in nature. There are no signs of an acute infectious etiology or more concerning illness. Laboratory analysis is reassuring, but does demonstrate leukocytosis although she has been on steroids. The remainder of her comprehensive metabolic panel is unremarkable. The patient was in agreement with this plan and subsequently discharged to home with follow up to primary care.    Diagnosis:    ICD-10-CM   1. Urticaria  L50.9       Disposition:   Patient is discharged home.     Discharge Medications:  START taking      Dose / Directions   cetirizine 10 MG tablet  Commonly known as:  zyrTEC      Dose:  10 mg  Take 1 tablet (10 mg) by mouth 2 times daily as needed for allergies (1 tab up to twice a day as needed for itch, rash, hives, allergy)  Quantity:  20 tablet  Refills:  0     famotidine 20 MG tablet  Commonly known as:  PEPCID      Dose:  20 mg  Take 1 tablet (20 mg) by mouth 2 times daily for 10 days  Quantity:  20 tablet  Refills:  0     methylPREDNISolone 4 MG tablet therapy pack  Commonly known as:  MEDROL DOSEPAK      Follow Package  Directions  Quantity:  21 tablet  Refills:  0       Scribe Disclosure:  I, Maura Liznuno, am serving as a scribe at 10:35 PM on 9/29/2020 to document services personally performed by Hesham Biggs MD based on my observations and the provider's statements to me.   EMERGENCY DEPARTMENT       eHsham Biggs MD  09/30/20 0057

## 2020-10-21 ENCOUNTER — HOSPITAL ENCOUNTER (EMERGENCY)
Facility: CLINIC | Age: 45
Discharge: HOME | End: 2020-10-21
Attending: EMERGENCY MEDICINE | Admitting: EMERGENCY MEDICINE
Payer: COMMERCIAL

## 2020-10-21 DIAGNOSIS — L50.9 URTICARIA: ICD-10-CM

## 2020-10-21 PROCEDURE — 250N000012 HC RX MED GY IP 250 OP 636 PS 637: Performed by: EMERGENCY MEDICINE

## 2020-10-21 PROCEDURE — 99283 EMERGENCY DEPT VISIT LOW MDM: CPT

## 2020-10-21 RX ORDER — PREDNISONE 20 MG/1
40 TABLET ORAL ONCE
Status: COMPLETED | OUTPATIENT
Start: 2020-10-21 | End: 2020-10-21

## 2020-10-21 RX ORDER — PREDNISONE 20 MG/1
40 TABLET ORAL DAILY
Qty: 6 TABLET | Refills: 0 | Status: SHIPPED | OUTPATIENT
Start: 2020-10-21 | End: 2020-10-24

## 2020-10-21 RX ADMIN — PREDNISONE 40 MG: 20 TABLET ORAL at 01:22

## 2020-10-21 ASSESSMENT — ENCOUNTER SYMPTOMS
FEVER: 0
WHEEZING: 0
SHORTNESS OF BREATH: 0
COUGH: 0

## 2020-10-21 NOTE — DISCHARGE INSTRUCTIONS
Call your allergist in the morning to discuss treatment going forward.  Return to the ER for any further concerns.

## 2020-11-29 ENCOUNTER — HEALTH MAINTENANCE LETTER (OUTPATIENT)
Age: 45
End: 2020-11-29

## 2021-04-14 ENCOUNTER — HOSPITAL ENCOUNTER (EMERGENCY)
Facility: CLINIC | Age: 46
Discharge: HOME | End: 2021-04-14
Attending: EMERGENCY MEDICINE | Admitting: EMERGENCY MEDICINE
Payer: COMMERCIAL

## 2021-04-14 VITALS
OXYGEN SATURATION: 99 % | HEART RATE: 90 BPM | DIASTOLIC BLOOD PRESSURE: 58 MMHG | BODY MASS INDEX: 23.3 KG/M2 | TEMPERATURE: 98.3 F | RESPIRATION RATE: 16 BRPM | SYSTOLIC BLOOD PRESSURE: 121 MMHG | HEIGHT: 66 IN | WEIGHT: 145 LBS

## 2021-04-14 DIAGNOSIS — B37.31 CANDIDIASIS OF VAGINA: ICD-10-CM

## 2021-04-14 DIAGNOSIS — N90.89 LABIAL LESION: ICD-10-CM

## 2021-04-14 LAB
SPECIMEN SOURCE: ABNORMAL
WET PREP SPEC: ABNORMAL

## 2021-04-14 PROCEDURE — 87591 N.GONORRHOEAE DNA AMP PROB: CPT | Performed by: EMERGENCY MEDICINE

## 2021-04-14 PROCEDURE — 87529 HSV DNA AMP PROBE: CPT | Performed by: EMERGENCY MEDICINE

## 2021-04-14 PROCEDURE — 87210 SMEAR WET MOUNT SALINE/INK: CPT | Performed by: EMERGENCY MEDICINE

## 2021-04-14 PROCEDURE — 99284 EMERGENCY DEPT VISIT MOD MDM: CPT

## 2021-04-14 PROCEDURE — 87491 CHLMYD TRACH DNA AMP PROBE: CPT | Performed by: EMERGENCY MEDICINE

## 2021-04-14 RX ORDER — FLUCONAZOLE 150 MG/1
150 TABLET ORAL ONCE
Qty: 1 TABLET | Refills: 0 | Status: SHIPPED | OUTPATIENT
Start: 2021-04-14 | End: 2021-04-14

## 2021-04-14 ASSESSMENT — ENCOUNTER SYMPTOMS
FEVER: 0
FREQUENCY: 0
HEMATURIA: 0
ABDOMINAL PAIN: 1

## 2021-04-14 ASSESSMENT — MIFFLIN-ST. JEOR: SCORE: 1329.47

## 2021-04-14 NOTE — ED PROVIDER NOTES
"  History   Chief Complaint:  Vaginal Discharge       HPI   Renetta Sears is a 45 year old female with history of hysterectomy, HPV, and yeast infections who presents with vaginal discharge and irrititation. 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 Tonight she is presenting because she has been having vaginal discharge and irritation. Additionally, she says the left lobe is sore. She describes the irritation as burning and she tried to use an exterior vaginal cream, but this only worsened the burning. She denies ever having herpes or any new sexual partners. She denies any vaginal bleeding, fevers, abdominal pains, or urination frequency or hematuria.     Review of Systems   Constitutional: Negative for fever.   Gastrointestinal: Positive for abdominal pain.   Genitourinary: Positive for vaginal discharge and vaginal pain. Negative for frequency, hematuria and vaginal bleeding.   All other systems reviewed and are negative.      Allergies:  Estrogens  Medroxyprogesterone    Medications:  Atarax  Magnesium Oxide PO  Medrol Dosepak    Past Medical History:    Acute cholecystitis  Depression  Esophagitis  Pulmonary embolism and infarction  Obesity  Anxiety  Chronic urethritis     Past Surgical History:    Tonsillectomy  Adenoidectomy  Laparoscopic cholecystectomy  Tubal ligation     Family History:    Diabetes  Hypertension  Arthritis  Thyroid disease  Depression  Breast cancer  Colorectal cancer    Social History:  Patient came from home.  Patient was unaccompanied in the ED.    Physical Exam     Patient Vitals for the past 24 hrs:   BP Temp Temp src Pulse Resp SpO2 Height Weight   04/14/21 0322 121/58 98.3  F (36.8  C) Oral 90 16 99 % 1.676 m (5' 6\") 65.8 kg (145 lb)     Physical Exam  SKIN:  Warm, dry.  HEMATOLOGIC/IMMUNOLOGIC/LYMPHATIC:  No pallor.  EYES:  Conjunctivae normal.  CARDIOVASCULAR:  Regular rate and " rhythm.  RESPIRATORY:  No respiratory distress, breath sounds equal and normal.  GASTROINTESTINAL:  Soft, nontender abdomen.  GENITOURINARY: Past centimeter shallow lesion tender to palpation of the left labia.  Thick white discharge within the vaginal canal.  MUSCULOSKELETAL: Normal body habitus.  NEUROLOGIC:  Alert, conversant.  PSYCHIATRIC:  Normal mood.    Emergency Department Course     Laboratory:     Wet prep: Yeast Seen (A), o/WNL  Chlamydia trachomatis PCR: Pending  Neisseria gonorrhea PCR: Pending     Herpes Simplex Virus 1&2 PCR Swab: Pending     Emergency Department Course:    Reviewed:  I reviewed nursing notes, vitals, past medical history and care everywhere    Assessments:  0339 I obtained history and examined the patient as noted above.   0526 I rechecked the patient and explained findings.     Disposition:  The patient was discharged to home.       Impression & Plan     Medical Decision Making:  Renetta Sears is a 45 year old female presenting for concern of vaginal discharge and irritation as well as a left labial lesion, which is painful. Underwent pelvic exam, which revealed a thick white discharge and the wet prep was positive for yeast. So likely suffering vaginal candidiasis. Patient states she did take one leftover dose of diflucan approximately 24 hours ago so I prescribed a 2nd dose, if she is still symptomatic 3 days after the first dose. Given that the lesion was painful, I doubt syphilis as this should be a painless lesion. Herpes was a concern. Testing performed for that also considered gonorrhea and chlamydia and testing results are pending for those organisms. Advised follow up.    Covid-19  Renetta Sears was evaluated during a global COVID-19 pandemic, which necessitated consideration that the patient might be at risk for infection with the SARS-CoV-2 virus that causes COVID-19.   Applicable protocols for evaluation were followed during the patient's care.   COVID-19 was  considered as part of the patient's evaluation. The plan for testing is:  a test was obtained at a previous visit and reviewed & considered today.    Diagnosis:    ICD-10-CM    1. Candidiasis of vagina  B37.3    2. Labial lesion  N90.89        Discharge Medications:  Discharge Medication List as of 4/14/2021  5:32 AM      START taking these medications    Details   fluconazole (DIFLUCAN) 150 MG tablet Take 1 tablet (150 mg) by mouth once for 1 dose, Disp-1 tablet, R-0, E-Prescribe             Scribe Disclosure:  Montez AGUSTIN, am serving as a scribe at 3:39 AM on 4/14/2021 to document services personally performed by Christian Barillas MD based on my observations and the provider's statements to me.         Christian Barillas MD  04/14/21 0617

## 2021-04-15 ENCOUNTER — TELEPHONE (OUTPATIENT)
Dept: EMERGENCY MEDICINE | Facility: CLINIC | Age: 46
End: 2021-04-15

## 2021-04-15 DIAGNOSIS — B00.9 HERPES SIMPLEX VIRUS INFECTION: ICD-10-CM

## 2021-04-15 LAB
C TRACH DNA SPEC QL NAA+PROBE: NEGATIVE
HSV1 DNA SPEC QL NAA+PROBE: NOT DETECTED
HSV2 DNA SPEC QL NAA+PROBE: DETECTED
LABORATORY COMMENT REPORT: ABNORMAL
N GONORRHOEA DNA SPEC QL NAA+PROBE: NEGATIVE
SPECIMEN SOURCE: ABNORMAL
SPECIMEN SOURCE: NORMAL
SPECIMEN SOURCE: NORMAL

## 2021-04-15 RX ORDER — VALACYCLOVIR HYDROCHLORIDE 1 G/1
1000 TABLET, FILM COATED ORAL 2 TIMES DAILY
Qty: 20 TABLET | Refills: 0 | Status: SHIPPED | OUTPATIENT
Start: 2021-04-15 | End: 2021-04-25

## 2021-04-15 NOTE — RESULT ENCOUNTER NOTE
Final result for both N. Gonorrhoeae PCR and Chlamydia Trachomatis PCR are NEGATIVE.  No treatment or change in treatment per [facility] ED Lab Result N. Gonorrhea AND/OR Chlamydia T. protocol.

## 2021-04-15 NOTE — TELEPHONE ENCOUNTER
[facility] Emergency Department Lab result notification:    Reason for call  Assess and inform patient of lab result  Lab Result  Final result for Herpes Simplex Virus 1 PCR is [NEGATIVE] and Herpes Simplex Virus 2 PCR is [POSITIVE].    [facility] Emergency Dept discharge antiviral medication (if prescribed): None  Recommendations in Treatment per [facility] ED Lab Result Herpes Simplex Virus Protocol  ED visit Date: 4/14/21  Symptoms reported at ED visit Renetta Sears is a 45 year old female presenting for concern of vaginal discharge and irritation as well as a left labial lesion, which is painful. Underwent pelvic exam, which revealed a thick white discharge and the wet prep was positive for yeast. So likely suffering vaginal candidiasis. Patient states she did take one leftover dose of diflucan approximately 24 hours ago so I prescribed a 2nd dose, if she is still symptomatic 3 days after the first dose. Given that the lesion was painful, I doubt syphilis as this should be a painless lesion. Herpes was a concern. Testing performed for that also considered gonorrhea and chlamydia and testing results are pending for those organisms. Advised follow up.    1. Candidiasis of vagina  B37.3     2. Labial lesion  N90.89        Miscellaneous information Christian Barillas MD  04/14/21 0617     Current symptoms  Pt is still feeling irritation in the vaginal area, but not worse.   Recommendations/Instructions  Patient notified of lab result and treatment recommendations.  Rx for Valacyclovir (VALTREX) 1000 mg tablet, 1 tablet (1,000 mg) by mouth 2 times daily for 10 days sent to [Pharmacy - [facility] [address] [facility]].  RN reviewed information about Herpes from the Herpes protocol in Epic. She didn't want to talk about too many details as she was upset. Advised she could call back or speak with her PCP. If on the internet to go to only solid medical site such as [facility] or [facility].       Contact your PCP clinic or return to the Emergency department if your:    Symptoms return.    Symptoms worsen or other concerning symptom's.    Cindy Jordan RN  [facility]   Lung Nodule and ED Lab Result F/U RN  Epic pool (ED late result f/u RN): P [id number]  # [phone]    Copy of Lab result   Herpes Simplex Virus 1&2 PCR Swab  Order: [id number]  Status:  Final result   Visible to patient:  Yes (MyChart) Dx:  Candidiasis of vagina  Specimen Information: Genital; Swab         Ref Range & Units 1d ago Resulting Agency   Herpes Specimen Description  Genital  [facility]   HSV Type 1 PCR NDET^Not Detected Not Detected  [facility]   Comment: Herpes simplex virus Type 1 DNA NOT detected, presumed negative for HSV-1. A   negative result does not rule out the presence of PCR inhibitors or HSV DNA in    concentrations below the limit of detection of the assay.    HSV Type 2 PCR NDET^Not Detected DetectedAbnormal   [facility]   Comment: Herpes simplex virus Type 2 DNA detected.   HSV Comment  (Note)  [facility]   Comment: The [employer] Molecular Simplexa HSV 1 & 2 Direct assay on the [employer] MDX   instrument is an FDA approved, real-time PCR test for the qualitative   detection and differentiation of Herpes Simplex virus Types 1 & 2 DNA in   mucocutaneous and cutaneous lesion swabs from patients with signs and symptoms    of HSV-1 or HSV-2 infection.          Specimen Collected: 04/14/21  4:25 AM Last Resulted: 04/15/21  1:49 PM

## 2021-05-08 ENCOUNTER — APPOINTMENT (OUTPATIENT)
Dept: ULTRASOUND IMAGING | Facility: CLINIC | Age: 46
End: 2021-05-08
Attending: NURSE PRACTITIONER
Payer: COMMERCIAL

## 2021-05-08 ENCOUNTER — APPOINTMENT (OUTPATIENT)
Dept: CT IMAGING | Facility: CLINIC | Age: 46
End: 2021-05-08
Attending: NURSE PRACTITIONER
Payer: COMMERCIAL

## 2021-05-08 ENCOUNTER — HOSPITAL ENCOUNTER (EMERGENCY)
Facility: CLINIC | Age: 46
Discharge: HOME | End: 2021-05-08
Attending: NURSE PRACTITIONER | Admitting: NURSE PRACTITIONER
Payer: COMMERCIAL

## 2021-05-08 VITALS
OXYGEN SATURATION: 97 % | RESPIRATION RATE: 12 BRPM | SYSTOLIC BLOOD PRESSURE: 94 MMHG | HEIGHT: 66 IN | HEART RATE: 85 BPM | WEIGHT: 155 LBS | BODY MASS INDEX: 24.91 KG/M2 | TEMPERATURE: 98.9 F | DIASTOLIC BLOOD PRESSURE: 64 MMHG

## 2021-05-08 DIAGNOSIS — R10.84 ABDOMINAL PAIN, GENERALIZED: ICD-10-CM

## 2021-05-08 DIAGNOSIS — N20.0 KIDNEY STONE: ICD-10-CM

## 2021-05-08 DIAGNOSIS — E80.6 HYPERBILIRUBINEMIA: ICD-10-CM

## 2021-05-08 LAB
ALBUMIN SERPL-MCNC: 3.9 G/DL (ref 3.4–5)
ALBUMIN UR-MCNC: NEGATIVE MG/DL
ALP SERPL-CCNC: 44 U/L (ref 40–150)
ALT SERPL W P-5'-P-CCNC: 27 U/L (ref 0–50)
ANION GAP SERPL CALCULATED.3IONS-SCNC: 3 MMOL/L (ref 3–14)
APPEARANCE UR: CLEAR
AST SERPL W P-5'-P-CCNC: 17 U/L (ref 0–45)
BASOPHILS # BLD AUTO: 0 10E9/L (ref 0–0.2)
BASOPHILS NFR BLD AUTO: 0.4 %
BILIRUB SERPL-MCNC: 1.6 MG/DL (ref 0.2–1.3)
BILIRUB UR QL STRIP: NEGATIVE
BUN SERPL-MCNC: 9 MG/DL (ref 7–30)
CALCIUM SERPL-MCNC: 9 MG/DL (ref 8.5–10.1)
CHLORIDE SERPL-SCNC: 108 MMOL/L (ref 94–109)
CO2 SERPL-SCNC: 28 MMOL/L (ref 20–32)
COLOR UR AUTO: NORMAL
CREAT SERPL-MCNC: 0.79 MG/DL (ref 0.52–1.04)
DIFFERENTIAL METHOD BLD: ABNORMAL
EOSINOPHIL # BLD AUTO: 0 10E9/L (ref 0–0.7)
EOSINOPHIL NFR BLD AUTO: 0 %
ERYTHROCYTE [DISTWIDTH] IN BLOOD BY AUTOMATED COUNT: 12.2 % (ref 10–15)
GFR SERPL CREATININE-BSD FRML MDRD: >90 ML/MIN/{1.73_M2}
GLUCOSE SERPL-MCNC: 103 MG/DL (ref 70–99)
GLUCOSE UR STRIP-MCNC: NEGATIVE MG/DL
HCT VFR BLD AUTO: 40.5 % (ref 35–47)
HGB BLD-MCNC: 13.7 G/DL (ref 11.7–15.7)
HGB UR QL STRIP: NEGATIVE
IMM GRANULOCYTES # BLD: 0 10E9/L (ref 0–0.4)
IMM GRANULOCYTES NFR BLD: 0.2 %
INTERPRETATION ECG - MUSE: NORMAL
KETONES UR STRIP-MCNC: NEGATIVE MG/DL
LACTATE BLD-SCNC: 1.1 MMOL/L (ref 0.7–2)
LEUKOCYTE ESTERASE UR QL STRIP: NEGATIVE
LIPASE SERPL-CCNC: 57 U/L (ref 73–393)
LYMPHOCYTES # BLD AUTO: 0.7 10E9/L (ref 0.8–5.3)
LYMPHOCYTES NFR BLD AUTO: 7.5 %
MCH RBC QN AUTO: 31 PG (ref 26.5–33)
MCHC RBC AUTO-ENTMCNC: 33.8 G/DL (ref 31.5–36.5)
MCV RBC AUTO: 92 FL (ref 78–100)
MONOCYTES # BLD AUTO: 0.3 10E9/L (ref 0–1.3)
MONOCYTES NFR BLD AUTO: 3.4 %
NEUTROPHILS # BLD AUTO: 8.8 10E9/L (ref 1.6–8.3)
NEUTROPHILS NFR BLD AUTO: 88.5 %
NITRATE UR QL: NEGATIVE
NRBC # BLD AUTO: 0 10*3/UL
NRBC BLD AUTO-RTO: 0 /100
PH UR STRIP: 7 PH (ref 5–7)
PLATELET # BLD AUTO: 237 10E9/L (ref 150–450)
POTASSIUM SERPL-SCNC: 3.8 MMOL/L (ref 3.4–5.3)
PROT SERPL-MCNC: 7.4 G/DL (ref 6.8–8.8)
RBC # BLD AUTO: 4.42 10E12/L (ref 3.8–5.2)
RBC #/AREA URNS AUTO: <1 /HPF (ref 0–2)
SODIUM SERPL-SCNC: 139 MMOL/L (ref 133–144)
SOURCE: NORMAL
SP GR UR STRIP: 1.01 (ref 1–1.03)
SQUAMOUS #/AREA URNS AUTO: 0 /HPF (ref 0–1)
TROPONIN I SERPL-MCNC: <0.015 UG/L (ref 0–0.04)
UROBILINOGEN UR STRIP-MCNC: 0 MG/DL (ref 0–2)
WBC # BLD AUTO: 9.9 10E9/L (ref 4–11)
WBC #/AREA URNS AUTO: 0 /HPF (ref 0–5)

## 2021-05-08 PROCEDURE — 74177 CT ABD & PELVIS W/CONTRAST: CPT

## 2021-05-08 PROCEDURE — 80053 COMPREHEN METABOLIC PANEL: CPT | Performed by: NURSE PRACTITIONER

## 2021-05-08 PROCEDURE — 76705 ECHO EXAM OF ABDOMEN: CPT

## 2021-05-08 PROCEDURE — 96375 TX/PRO/DX INJ NEW DRUG ADDON: CPT

## 2021-05-08 PROCEDURE — 93005 ELECTROCARDIOGRAM TRACING: CPT

## 2021-05-08 PROCEDURE — 96374 THER/PROPH/DIAG INJ IV PUSH: CPT | Mod: 59

## 2021-05-08 PROCEDURE — 250N000009 HC RX 250: Performed by: NURSE PRACTITIONER

## 2021-05-08 PROCEDURE — 85025 COMPLETE CBC W/AUTO DIFF WBC: CPT | Performed by: NURSE PRACTITIONER

## 2021-05-08 PROCEDURE — 99285 EMERGENCY DEPT VISIT HI MDM: CPT | Mod: 25

## 2021-05-08 PROCEDURE — 96361 HYDRATE IV INFUSION ADD-ON: CPT

## 2021-05-08 PROCEDURE — 96376 TX/PRO/DX INJ SAME DRUG ADON: CPT

## 2021-05-08 PROCEDURE — 84484 ASSAY OF TROPONIN QUANT: CPT | Performed by: NURSE PRACTITIONER

## 2021-05-08 PROCEDURE — 258N000003 HC RX IP 258 OP 636: Performed by: NURSE PRACTITIONER

## 2021-05-08 PROCEDURE — 250N000011 HC RX IP 250 OP 636: Performed by: NURSE PRACTITIONER

## 2021-05-08 PROCEDURE — 83605 ASSAY OF LACTIC ACID: CPT | Performed by: NURSE PRACTITIONER

## 2021-05-08 PROCEDURE — 81001 URINALYSIS AUTO W/SCOPE: CPT | Performed by: NURSE PRACTITIONER

## 2021-05-08 PROCEDURE — 83690 ASSAY OF LIPASE: CPT | Performed by: NURSE PRACTITIONER

## 2021-05-08 RX ORDER — HYDROCODONE BITARTRATE AND ACETAMINOPHEN 5; 325 MG/1; MG/1
1 TABLET ORAL EVERY 6 HOURS PRN
Qty: 6 TABLET | Refills: 0 | Status: SHIPPED | OUTPATIENT
Start: 2021-05-08 | End: 2021-05-11

## 2021-05-08 RX ORDER — ONDANSETRON 2 MG/ML
4 INJECTION INTRAMUSCULAR; INTRAVENOUS
Status: COMPLETED | OUTPATIENT
Start: 2021-05-08 | End: 2021-05-08

## 2021-05-08 RX ORDER — IOPAMIDOL 755 MG/ML
78 INJECTION, SOLUTION INTRAVASCULAR ONCE
Status: COMPLETED | OUTPATIENT
Start: 2021-05-08 | End: 2021-05-08

## 2021-05-08 RX ORDER — ONDANSETRON 4 MG/1
4 TABLET, ORALLY DISINTEGRATING ORAL EVERY 8 HOURS PRN
Qty: 10 TABLET | Refills: 0 | Status: SHIPPED | OUTPATIENT
Start: 2021-05-08 | End: 2021-05-11

## 2021-05-08 RX ORDER — HYDROMORPHONE HYDROCHLORIDE 1 MG/ML
0.5 INJECTION, SOLUTION INTRAMUSCULAR; INTRAVENOUS; SUBCUTANEOUS
Status: DISCONTINUED | OUTPATIENT
Start: 2021-05-08 | End: 2021-05-08 | Stop reason: HOSPADM

## 2021-05-08 RX ADMIN — HYDROMORPHONE HYDROCHLORIDE 0.5 MG: 1 INJECTION, SOLUTION INTRAMUSCULAR; INTRAVENOUS; SUBCUTANEOUS at 13:33

## 2021-05-08 RX ADMIN — SODIUM CHLORIDE 1000 ML: 9 INJECTION, SOLUTION INTRAVENOUS at 12:51

## 2021-05-08 RX ADMIN — HYDROMORPHONE HYDROCHLORIDE 0.5 MG: 1 INJECTION, SOLUTION INTRAMUSCULAR; INTRAVENOUS; SUBCUTANEOUS at 12:52

## 2021-05-08 RX ADMIN — IOPAMIDOL 78 ML: 755 INJECTION, SOLUTION INTRAVENOUS at 13:02

## 2021-05-08 RX ADMIN — SODIUM CHLORIDE 62 ML: 9 INJECTION, SOLUTION INTRAVENOUS at 13:03

## 2021-05-08 RX ADMIN — ONDANSETRON 4 MG: 2 INJECTION INTRAMUSCULAR; INTRAVENOUS at 12:51

## 2021-05-08 ASSESSMENT — ENCOUNTER SYMPTOMS
BLOOD IN STOOL: 0
ABDOMINAL PAIN: 1
CONSTIPATION: 0
DIARRHEA: 0
NAUSEA: 1
COUGH: 0
DYSURIA: 0
FEVER: 0
HEMATURIA: 0
FREQUENCY: 0
SHORTNESS OF BREATH: 0
VOMITING: 1
CHILLS: 0

## 2021-05-08 ASSESSMENT — MIFFLIN-ST. JEOR: SCORE: 1374.83

## 2021-05-08 NOTE — ED PROVIDER NOTES
History   Chief Complaint:  Abdominal Pain    HPI   Renetta Sears is a 45 year old female, s/p hernia repair with a history of pulmonary embolism (not anticoagulated thought due to hormone birth control), who presents to the ED for evaluation of abdominal pain. The patient reports she had the sudden onset of central abdominal pain beginning at 0730. She states the pain is constant and she has felt nausea and vomited this morning. She denies any bowel abnormalities. She did not have any abdominal pain over the past couple of days. She has had no fever, chills, or shortness of breath. The pain does not radiate into her back or elsewhere in her body. The patient conveys she is most comfortable in the fetal position. She denies any urinary symptoms. The patient has no chest pain or cough or shortness of breath.  She has history of tubal ligation.    Review of Systems   Constitutional: Negative for chills and fever.   Respiratory: Negative for cough and shortness of breath.    Cardiovascular: Negative for chest pain.   Gastrointestinal: Positive for abdominal pain, nausea and vomiting. Negative for blood in stool, constipation and diarrhea.   Genitourinary: Negative for dysuria, frequency and hematuria.   All other systems reviewed and are negative.    Allergies:  Estrogens  Medroxyprogesterone    Medications:    Atarax    Past Medical History:    Depression  Esophagitis medicamentosa  Pulmonary embolism  Headache    Past Surgical History:    Tonsillectomy  Adenoidectomy  Cholecystectomy  Tubal ligation  Hernia repair    Family History:    Arthritis  Thyroid disease    Social History:  Smoking status: No  Alcohol use: No  PCP: Malena Onofre  Presents to the ED alone    Physical Exam     Patient Vitals for the past 24 hrs:   BP Temp Temp src Pulse Resp SpO2 Height Weight   05/08/21 1400 94/64 -- -- 85 12 97 % -- --   05/08/21 1330 123/64 -- -- 86 12 100 % -- --   05/08/21 1320 112/65 -- -- 83 14 98 % -- --  "  05/08/21 1300 122/79 -- -- -- 14 97 % -- --   05/08/21 1222 131/75 98.9  F (37.2  C) Oral 77 18 97 % 1.676 m (5' 6\") 70.3 kg (155 lb)       Physical Exam  Nursing notes reviewed. Vitals reviewed.  General: Alert. Well kept.  Eyes:  Conjunctiva non-injected, non-icteric.  Neck/Throat: Moist mucous membranes.  Normal voice.  Cardiac: Regular rhythm. Normal heart sounds with no murmur/rubs/click.   Pulmonary: Clear and equal breath sounds bilaterally. No crackles/rales. No wheezing  Abdomen: Soft. No guarding or rebound. Periumbilical tenderness. No CVA tenderness.  Musculoskeletal: Normal gross range of motion of all 4 extremities.    Neurological: Alert and oriented x4.   Skin: Warm and dry without rashes or petechiae. Normal appearance of visualized exposed skin.  Psych: Affect normal. Good eye contact.    Emergency Department Course   ECG (14:03:36):  Rate 67 bpm. IL interval 168. QRS duration 82. QT/QTc 436/460. P-R-T axes 42 50 48. Normal sinus rhythm. Normal ECG. Interpreted by Genaro Crockett MD.    Imaging:    CT Abd/pelvis with contrast:  1.  No acute cause for pain demonstrated.   2.  Nonobstructing left renal calculus.     US Abdomen limited, RUQ:  Previous cholecystectomy. Otherwise unremarkable right upper quadrant   ultrasound.     Imaging independently reviewed and agree with radiologist interpretation.     Laboratory:  CBC: WBC 9.9, HGB 13.7,     CMP:  (H), Bilirubin Total 1.6 (H), o/w WNL (Creatinine 0.79)    Lipase: 57 (L)    Lactic Acid (Resulted 1250): 1.1    UA: Negative    Emergency Department Course:    Reviewed:  I reviewed the patient's nursing notes, vitals, past available medical records.     Assessments:  0025: I obtained history and examined the patient as noted above.     1356: I rechecked the patient and explained findings. Will await US results.     1500: I rechecked the patient. Explained findings to patient. The patient's pain is much improved now and is able to be " discharged. She understands the plan. All questions answered.    Interventions:  1251: NS 1L IV Bolus   1251: Zofran 4 mg IV  1252: Dilaudid 0.5 mg IV    Disposition:  The patient was discharged to home.    Impression & Plan    Medical Decision Making:  Renetta Sears is a 45 year old female who presents to the emergency department for evaluation of abdominal pain. On exam, the patient has tenderness around the periumbilical region with no focal tenderness. There is no hernia noted on exam. She denies change in urination or constipation/diarrhea. She also denies chest pain or signs of illness such has cough or fever.  She has no tachycardia, hypoxia, shortness of breath, or chest pain and symptoms are not consistent with pulmonary embolism.  Initial EKG show no ischemia. Workup is not consistent with referred pain from cardiopulmonary disease. CT of the abdomen and pelvis shows a non-obstructing renal calculus, but otherwise no acute cause of her symptoms. Her lactate does return negative at 1.1, making mesenteric ischemia unlikely. She has a normal WBC and normal kidney function. She does have an isolated elevated bilirubin at 1.6, but the rest of her hepatic panel is within normal limits and her lipase is normal. She has had a cholecystectomy, but with concern for abnormal bilirubin, a right upper quadrant ultrasound was obtained and shows no acute abnormalities. She was treated with Zofran and Dilaudid and had near resolution of her symptoms. No indication for admission as patient is tolerating fluids and allows for palpation of her abdomen without peritoneal signs. She will be discharged home with a short course of Vicodin as needed and Zofran. She will also restart her Miralax. As her pain appears to be improving, the patient is in agreement with this plan. She knows to return to the emergency department with any return or change or worsening of her pain, fever, or any other concerns.  She will otherwise  follow-up with primary care on Monday for recheck of her bilirubin and symptoms.    Diagnosis:    ICD-10-CM    1. Kidney stone  N20.0 Troponin I   2. Hyperbilirubinemia  E80.6    3. Abdominal pain, generalized  R10.84        Discharge Medications:  Discharge Medication List as of 5/8/2021  3:09 PM      START taking these medications    Details   HYDROcodone-acetaminophen (NORCO) 5-325 MG tablet Take 1 tablet by mouth every 6 hours as needed, Disp-6 tablet, R-0, E-Prescribe      ondansetron (ZOFRAN ODT) 4 MG ODT tab Take 1 tablet (4 mg) by mouth every 8 hours as needed, Disp-10 tablet, R-0, E-Prescribe           Scribe Disclosure:  I, Vlad Miramontes, am serving as a scribe at 12:25 PM on 5/8/2021 to document services personally performed by Keke Solitario, GUICHO based on my observations and the provider's statements to me.      Keke Solitario, CNP  05/08/21 2313

## 2021-05-28 ENCOUNTER — RECORDS - HEALTHEAST (OUTPATIENT)
Dept: ADMINISTRATIVE | Facility: CLINIC | Age: 46
End: 2021-05-28

## 2021-06-03 VITALS — BODY MASS INDEX: 25.43 KG/M2 | WEIGHT: 158.25 LBS | HEIGHT: 66 IN

## 2021-06-03 NOTE — ANESTHESIA POSTPROCEDURE EVALUATION
Patient: Renetta Sears  TOTAL LAPAROSCOPIC HYSTERECTOMY BILATERAL SALPINGECTOMY CYSTOSCOPY  Anesthesia type: general    Patient location: PACU  Last vitals:   Vitals Value Taken Time   BP 98/56 11/12/2019 12:30 PM   Temp 37.1  C (98.8  F) 11/12/2019 12:30 PM   Pulse 72 11/12/2019 12:30 PM   Resp 16 11/12/2019 12:30 PM   SpO2 95 % 11/12/2019 12:30 PM     Post vital signs: stable  Level of consciousness: awake and responds to simple questions  Post-anesthesia pain: pain controlled  Post-anesthesia nausea and vomiting: no  Pulmonary: unassisted, return to baseline  Cardiovascular: stable and blood pressure at baseline  Hydration: adequate  Anesthetic events: no    QCDR Measures:  ASA# 11 - Teresa-op Cardiac Arrest: ASA11B - Patient did NOT experience unanticipated cardiac arrest  ASA# 12 - Teresa-op Mortality Rate: ASA12B - Patient did NOT die  ASA# 13 - PACU Re-Intubation Rate: ASA13B - Patient did NOT require a new airway mgmt  ASA# 10 - Composite Anes Safety: ASA10A - No serious adverse event    Additional Notes:

## 2021-06-03 NOTE — ANESTHESIA CARE TRANSFER NOTE
Last vitals:   Vitals:    11/12/19 1103   BP: 99/54   Pulse: 79   Resp: 12   Temp: 36.1  C (97  F)   SpO2: 99%     Patient's level of consciousness is drowsy  Spontaneous respirations: yes  Maintains airway independently: yes  Dentition unchanged: yes  Oropharynx: oropharynx clear of all foreign objects    QCDR Measures:  ASA# 20 - Surgical Safety Checklist: WHO surgical safety checklist completed prior to induction    PQRS# 430 - Adult PONV Prevention: 4558F - Pt received => 2 anti-emetic agents (different classes) preop & intraop  ASA# 8 - Peds PONV Prevention: NA - Not pediatric patient, not GA or 2 or more risk factors NOT present  PQRS# 424 - Teresa-op Temp Management: 4559F - At least one body temp DOCUMENTED => 35.5C or 95.9F within required timeframe  PQRS# 426 - PACU Transfer Protocol: - Transfer of care checklist used  ASA# 14 - Acute Post-op Pain: ASA14B - Patient did NOT experience pain >= 7 out of 10

## 2021-06-03 NOTE — ANESTHESIA PREPROCEDURE EVALUATION
Anesthesia Evaluation      Patient summary reviewed   History of anesthetic complications     Airway   Mallampati: II  Neck ROM: full   Pulmonary - negative ROS and normal exam    breath sounds clear to auscultation  (-) COPD, asthma, shortness of breath, recent URI, sleep apnea, not a smoker                         Cardiovascular   (-) hypertension, past MI, CAD, CABG/stent, hypercholesterolemia  Rhythm: regular  Rate: normal,         Neuro/Psych    (+) anxiety/panic attacks,   (-) no seizures    Endo/Other    (-) no diabetes, hypothyroidism     GI/Hepatic/Renal    (-) GERD          Dental - normal exam                        Anesthesia Plan  Planned anesthetic: general endotracheal  Pt very anxious-versed prior to OR. Background of precedex, pt reports wanting to crawl out of her skin after prior anesthetics.   ASA 2   Induction: intravenous       Post-op plan: routine recovery           dm2 with hyperglycemia  HA1C 8.4%

## 2021-07-30 ENCOUNTER — APPOINTMENT (OUTPATIENT)
Dept: GENERAL RADIOLOGY | Facility: CLINIC | Age: 46
End: 2021-07-30
Attending: EMERGENCY MEDICINE
Payer: COMMERCIAL

## 2021-07-30 ENCOUNTER — HOSPITAL ENCOUNTER (EMERGENCY)
Facility: CLINIC | Age: 46
Discharge: HOME | End: 2021-07-30
Attending: EMERGENCY MEDICINE | Admitting: EMERGENCY MEDICINE
Payer: COMMERCIAL

## 2021-07-30 VITALS
OXYGEN SATURATION: 97 % | DIASTOLIC BLOOD PRESSURE: 66 MMHG | BODY MASS INDEX: 23.7 KG/M2 | RESPIRATION RATE: 18 BRPM | TEMPERATURE: 98.9 F | SYSTOLIC BLOOD PRESSURE: 121 MMHG | HEART RATE: 92 BPM | HEIGHT: 69 IN | WEIGHT: 160 LBS

## 2021-07-30 DIAGNOSIS — U07.1 INFECTION DUE TO 2019 NOVEL CORONAVIRUS: ICD-10-CM

## 2021-07-30 PROCEDURE — 71045 X-RAY EXAM CHEST 1 VIEW: CPT

## 2021-07-30 PROCEDURE — 99283 EMERGENCY DEPT VISIT LOW MDM: CPT | Mod: 25

## 2021-07-30 PROCEDURE — 250N000013 HC RX MED GY IP 250 OP 250 PS 637: Performed by: EMERGENCY MEDICINE

## 2021-07-30 RX ORDER — HYDROCODONE BITARTRATE AND ACETAMINOPHEN 5; 325 MG/1; MG/1
1-2 TABLET ORAL EVERY 6 HOURS PRN
Qty: 10 TABLET | Refills: 0 | Status: SHIPPED | OUTPATIENT
Start: 2021-07-30 | End: 2021-08-02

## 2021-07-30 RX ORDER — HYDROCODONE BITARTRATE AND ACETAMINOPHEN 5; 325 MG/1; MG/1
2 TABLET ORAL ONCE
Status: COMPLETED | OUTPATIENT
Start: 2021-07-30 | End: 2021-07-30

## 2021-07-30 RX ORDER — IBUPROFEN 400 MG/1
800 TABLET, FILM COATED ORAL ONCE
Status: COMPLETED | OUTPATIENT
Start: 2021-07-30 | End: 2021-07-30

## 2021-07-30 RX ADMIN — IBUPROFEN 800 MG: 400 TABLET ORAL at 21:04

## 2021-07-30 RX ADMIN — HYDROCODONE BITARTRATE AND ACETAMINOPHEN 2 TABLET: 5; 325 TABLET ORAL at 21:40

## 2021-07-30 ASSESSMENT — ENCOUNTER SYMPTOMS
ABDOMINAL PAIN: 1
COUGH: 1

## 2021-07-30 ASSESSMENT — MIFFLIN-ST. JEOR: SCORE: 1445.14

## 2021-07-31 NOTE — DISCHARGE INSTRUCTIONS
Push fluids, Motrin or Tylenol as needed.  Self quarantine until you are symptom-free for 48 hours.  If your breathing gets significantly worse, contact your doctor for guidance on what to do.  Norco as needed for more severe pain.

## 2021-07-31 NOTE — ED PROVIDER NOTES
History     Chief Complaint:  Covid Concern      The history is provided by the patient.      Renetta Sears is a 45 year old female who presents with covid-19 concern. The patient was diagnosed with Covid-19 and is having multiple symptoms. She complains of cough, chest pain, body aches, and side pain. She has been taking tylenol and motrin for pain but they are not helping much. She was not vaccinated for Covid-19.  She is not short of breath.  She came in mainly because everything hurts on her.    Review of Systems   Respiratory: Positive for cough.    Cardiovascular: Positive for chest pain.   Gastrointestinal: Positive for abdominal pain (Side pain).   Musculoskeletal:        Positive for body aches   All other systems reviewed and are negative.        Allergies:  Estrogens  Medroxyprogesterone    Medications:    Atarax   Medrol   Valtrex   Zyrtec   Singulair   Sinequan     Past Medical History:    Acute cholecystitis   Depression   Esophagitis medicamentosa   PE and infarction   Syncope   Obesity   Anxiety   Chronic urethritis   Chronic headache  Herpes simplex vulvovaginitis   Vitamin D deficiency   Hidradenitis suppurativa   HLD   Thyroid nodule   Seborrheic dermatitis of scalp    Post concussion syndrome   Depression Chronic urethritis     Past Surgical History:    Tonsillectomy   Tubal ligation   Cholecystectomy   Hernia repair   Cervical repair   Adenoidectomy   Hysterectomy     Family History:    Father - depression, diabetes, HTN, high cholesterol, mental disorder, rheumatoid arthritis  Mother - arthritis, depression, high cholesterol, thyroid disease  Daughter(s) - anxiety, depression   Sister - depression, mental disorder  Son(s) - anxiety, drug abuse, mental disorder     Social History:  Patient was unaccompanied to the ED.    Physical Exam     Patient Vitals for the past 24 hrs:   BP Temp Temp src Pulse Resp SpO2 Height Weight   07/30/21 1826 121/66 98.9  F (37.2  C) Oral 92 18 97 % 1.753 m  "(5' 9\") 72.6 kg (160 lb)       Physical Exam  Exam performed through observation from the door.    Constitutional: Patient appears comfortable, not dyspneic, not coughing.    HENT: Voice normal    Respiratory:  No respiratory distress, able to speak in full sentences.  Respiratory rate appears normal.    Neurologic: Alert and interacting normally.  Oriented, no obvious focal weakness, gait is normal.    Psych: Mentation is normal, thought processes normal.        Emergency Department Course   Imaging:  XR Chest Port 1 View  Final Result  IMPRESSION: Negative chest. Lungs clear.  JOSE LUIS ZHAO MD   Per Radiology     Emergency Department Course:    Reviewed:  I reviewed nursing notes, vitals, past history and care everywhere    Assessments:  2128 I obtained history and examined the patient as noted above.     Interventions:  2104 Advil 800mg PO  2135 Norco 650 PO    Disposition:  The patient was discharged to home.    Impression & Plan    Medical Decision Making:  Patientcomes today with known COVID19.  Vital signs are reassuring.  The patient is not hypoxic.  The patient's work of breathing is normal.  Mentation is normal.  The patient does not require hospitalization.  At this time, I believe the patient does not meet criteria for hospitalization.  She is having a lot of body aches and joint aches and was given initially Motrin here and then 2 Norco tablets.  I will prescribe a small prescription for Norco and have her self quarantine until she is symptom-free for 48 hours.  She can always call her provider or return to the ER if her respiratory status dramatically worsens.    Push fluids, Motrin or Tylenol as needed.  Self quarantine until you are symptom-free for 48 hours.  If your breathing gets significantly worse, contact your doctor for guidance on what to do.  Norco as needed for more severe pain.     Covid-19  Renetta Sears was evaluated during a global COVID-19 pandemic, which necessitated " consideration that the patient might be at risk for infection with the SARS-CoV-2 virus that causes COVID-19.   Applicable protocols for evaluation were followed during the patient's care.   COVID-19 was considered as part of the patient's evaluation. A test was obtained at a previous visit and reviewed & considered today.      Diagnosis:    ICD-10-CM    1. Infection due to 2019 novel coronavirus  U07.1        Discharge Medications:  New Prescriptions    HYDROCODONE-ACETAMINOPHEN (NORCO) 5-325 MG TABLET    Take 1-2 tablets by mouth every 6 hours as needed for severe pain         Scribe Disclosure:  I, Chris Cole, am serving as a scribe at 8:39 PM on 7/30/2021 to document services personally performed by Zahraa Garces MD based on my observations and the provider's statements to me.      Zahraa Garces MD  07/30/21 9145

## 2021-09-19 ENCOUNTER — HEALTH MAINTENANCE LETTER (OUTPATIENT)
Age: 46
End: 2021-09-19

## 2021-12-18 ENCOUNTER — APPOINTMENT (OUTPATIENT)
Dept: ULTRASOUND IMAGING | Facility: CLINIC | Age: 46
End: 2021-12-18
Attending: EMERGENCY MEDICINE
Payer: COMMERCIAL

## 2021-12-18 ENCOUNTER — HOSPITAL ENCOUNTER (EMERGENCY)
Facility: CLINIC | Age: 46
Discharge: HOME | End: 2021-12-18
Attending: EMERGENCY MEDICINE | Admitting: EMERGENCY MEDICINE
Payer: COMMERCIAL

## 2021-12-18 VITALS
BODY MASS INDEX: 23.7 KG/M2 | WEIGHT: 160 LBS | HEART RATE: 82 BPM | RESPIRATION RATE: 14 BRPM | SYSTOLIC BLOOD PRESSURE: 114 MMHG | HEIGHT: 69 IN | OXYGEN SATURATION: 100 % | DIASTOLIC BLOOD PRESSURE: 62 MMHG | TEMPERATURE: 98.7 F

## 2021-12-18 DIAGNOSIS — S46.911A STRAIN OF RIGHT SHOULDER, INITIAL ENCOUNTER: ICD-10-CM

## 2021-12-18 PROCEDURE — 99284 EMERGENCY DEPT VISIT MOD MDM: CPT | Mod: 25

## 2021-12-18 PROCEDURE — 93971 EXTREMITY STUDY: CPT | Mod: RT

## 2021-12-18 RX ORDER — OXYCODONE HYDROCHLORIDE 5 MG/1
5 TABLET ORAL EVERY 6 HOURS PRN
Qty: 8 TABLET | Refills: 0 | Status: ON HOLD | OUTPATIENT
Start: 2021-12-18 | End: 2022-07-07

## 2021-12-18 ASSESSMENT — ENCOUNTER SYMPTOMS
NUMBNESS: 0
FEVER: 0
WEAKNESS: 0
SHORTNESS OF BREATH: 0

## 2021-12-18 ASSESSMENT — MIFFLIN-ST. JEOR: SCORE: 1440.14

## 2021-12-18 NOTE — ED TRIAGE NOTES
Patient here with right arm pain which started on tuesday. She stated  Lifted weights on Monday. She c/o numbness.

## 2021-12-18 NOTE — DISCHARGE INSTRUCTIONS

## 2021-12-18 NOTE — ED PROVIDER NOTES
History   Chief Complaint:  Arm Pain       HPI   Renetta Sears is a 46 year old female with a history of arthritis who presents with right arm pain and swelling for four days, worsening yesterday. The pain is worse at her shoulder. She has been managing with ibuprofen, Motrin, and Icy Hot. She last took 600 mg ibuprofen 6.5 hours prior to evaluation.  She benched press equal weights on both arms five days ago. She denies any fall or trauma.    Review of Systems   Constitutional: Negative for fever.   Respiratory: Negative for shortness of breath.    Cardiovascular: Negative for chest pain.   Musculoskeletal:        Right arm pain (+)   Neurological: Negative for weakness and numbness.   All other systems reviewed and are negative.      Allergies:  Estrogens  Medroxyprogesterone    Medications:  Elavil  Wellbutrin     Past Medical History:     Acute cholecystitis   Depression  Esophagitis medicamentosa  PE  Chronic urethritis  Anxiety  Obesity     Arthritis  Hyperlipidemia   Vitamin D deficiency     Past Surgical History:    Cholecystectomy  tonsillectomy & adenoidectomy  hernia repair  Hysterectomy  Tubal ligation      Family History:    Mother - arthritis, thyroid disease  Father - diabetes, hypertension     Social History:  Patient presents alone.    Physical Exam       Physical Exam  General: Appears well-developed and well-nourished.   Head: No signs of trauma.   Neck: Normal range of motion. No nuchal rigidity. No cervical adenopathy  CV: Normal rate and regular rhythm.    Resp: Effort normal and breath sounds normal. No respiratory distress.   MSK:  +tenderness to right lateral shoulder/deltoid region.  Pain increases with ROM, particularly past 90 degrees.  2+ radial pulses.  Normal strength and sensation distally.   Neuro: The patient is alert and oriented. Speech normal.  Skin: Skin is warm and dry. No rash noted.   Psych: normal mood and affect. behavior is normal.       Emergency Department Course      Imaging:  US Upper Extremity Venous Duplex Right   Final Result   IMPRESSION:   1.  No deep venous thrombosis in the right upper extremity.        Report per radiology      Emergency Department Course:    Reviewed:  I reviewed nursing notes, vitals, past medical history and Care Everywhere    Assessments:  0430 I obtained history and examined the patient as noted above.     Disposition:  The patient was discharged to home.     Impression & Plan     CMS Diagnoses: None    Medical Decision Making:  Patient is a 46-year-old woman who presents due to right shoulder pain.  She states that she was lifting weights on Monday.  On Tuesday she began to have some pain in the right shoulder that became worse ultimately prompting her come to the hospital.  On my evaluation there is no signs of infection.  She denies any traumatic injury and I did not feel that an x-ray was indicated.  She did have pain with range of motion primarily of the shoulder.  She complained of some swelling of the arm although I did not appreciate significant swelling.  I did obtain an ultrasound which did not show any signs of DVT or other acute process.  I believe the patient likely has a strain of the shoulder secondary to lifting.  She was given a sling for comfort and I recommended she continue with range of motion exercises along with Tylenol and ibuprofen.  Did agree to give her a prescription for a few oxycodone to help primarily at night with sleeping, she is instructed not to drive if she takes this.  She is recommended to follow-up with orthopedics for further evaluation.      Diagnosis:    ICD-10-CM    1. Strain of right shoulder, initial encounter  S46.911A        Discharge Medications:  New Prescriptions    OXYCODONE (ROXICODONE) 5 MG TABLET    Take 1 tablet (5 mg) by mouth every 6 hours as needed for pain       Scribe Disclosure:  Juan AGUSTIN, am serving as a scribe at 4:27 AM on 12/18/2021 to document services personally  performed by Von Molina MD based on my observations and the provider's statements to me.            Von Molina MD  12/18/21 0583

## 2022-01-09 ENCOUNTER — HEALTH MAINTENANCE LETTER (OUTPATIENT)
Age: 47
End: 2022-01-09

## 2022-02-25 ENCOUNTER — APPOINTMENT (OUTPATIENT)
Dept: ULTRASOUND IMAGING | Facility: CLINIC | Age: 47
End: 2022-02-25
Attending: STUDENT IN AN ORGANIZED HEALTH CARE EDUCATION/TRAINING PROGRAM
Payer: COMMERCIAL

## 2022-02-25 ENCOUNTER — HOSPITAL ENCOUNTER (EMERGENCY)
Facility: CLINIC | Age: 47
Discharge: HOME | End: 2022-02-25
Attending: STUDENT IN AN ORGANIZED HEALTH CARE EDUCATION/TRAINING PROGRAM | Admitting: STUDENT IN AN ORGANIZED HEALTH CARE EDUCATION/TRAINING PROGRAM
Payer: COMMERCIAL

## 2022-02-25 ENCOUNTER — APPOINTMENT (OUTPATIENT)
Dept: CT IMAGING | Facility: CLINIC | Age: 47
End: 2022-02-25
Attending: STUDENT IN AN ORGANIZED HEALTH CARE EDUCATION/TRAINING PROGRAM
Payer: COMMERCIAL

## 2022-02-25 VITALS
HEART RATE: 80 BPM | DIASTOLIC BLOOD PRESSURE: 57 MMHG | OXYGEN SATURATION: 100 % | BODY MASS INDEX: 22.89 KG/M2 | WEIGHT: 155 LBS | SYSTOLIC BLOOD PRESSURE: 128 MMHG | TEMPERATURE: 99.5 F | RESPIRATION RATE: 30 BRPM

## 2022-02-25 DIAGNOSIS — R10.31 RLQ ABDOMINAL PAIN: ICD-10-CM

## 2022-02-25 LAB
ALBUMIN SERPL-MCNC: 4.1 G/DL (ref 3.5–5)
ALBUMIN UR-MCNC: NEGATIVE MG/DL
ALP SERPL-CCNC: 49 U/L (ref 45–120)
ALT SERPL W P-5'-P-CCNC: 20 U/L (ref 0–45)
ANION GAP SERPL CALCULATED.3IONS-SCNC: 10 MMOL/L (ref 5–18)
APPEARANCE UR: CLEAR
AST SERPL W P-5'-P-CCNC: 15 U/L (ref 0–40)
BACTERIA #/AREA URNS HPF: ABNORMAL /HPF
BASOPHILS # BLD AUTO: 0 10E3/UL (ref 0–0.2)
BASOPHILS NFR BLD AUTO: 0 %
BILIRUB SERPL-MCNC: 1 MG/DL (ref 0–1)
BILIRUB UR QL STRIP: NEGATIVE
BUN SERPL-MCNC: 7 MG/DL (ref 8–22)
CALCIUM SERPL-MCNC: 9.1 MG/DL (ref 8.5–10.5)
CHLORIDE BLD-SCNC: 105 MMOL/L (ref 98–107)
CLUE CELLS: NORMAL
CO2 SERPL-SCNC: 22 MMOL/L (ref 22–31)
COLOR UR AUTO: ABNORMAL
CREAT SERPL-MCNC: 0.7 MG/DL (ref 0.6–1.1)
EOSINOPHIL # BLD AUTO: 0.1 10E3/UL (ref 0–0.7)
EOSINOPHIL NFR BLD AUTO: 2 %
ERYTHROCYTE [DISTWIDTH] IN BLOOD BY AUTOMATED COUNT: 12.3 % (ref 10–15)
GFR SERPL CREATININE-BSD FRML MDRD: >90 ML/MIN/1.73M2
GLUCOSE BLD-MCNC: 97 MG/DL (ref 70–125)
GLUCOSE UR STRIP-MCNC: NEGATIVE MG/DL
HCG UR QL: NEGATIVE
HCT VFR BLD AUTO: 40.8 % (ref 35–47)
HGB BLD-MCNC: 13.8 G/DL (ref 11.7–15.7)
HGB UR QL STRIP: NEGATIVE
IMM GRANULOCYTES # BLD: 0 10E3/UL
IMM GRANULOCYTES NFR BLD: 0 %
KETONES UR STRIP-MCNC: NEGATIVE MG/DL
LEUKOCYTE ESTERASE UR QL STRIP: NEGATIVE
LIPASE SERPL-CCNC: 11 U/L (ref 0–52)
LYMPHOCYTES # BLD AUTO: 2.4 10E3/UL (ref 0.8–5.3)
LYMPHOCYTES NFR BLD AUTO: 36 %
MCH RBC QN AUTO: 32 PG (ref 26.5–33)
MCHC RBC AUTO-ENTMCNC: 33.8 G/DL (ref 31.5–36.5)
MCV RBC AUTO: 95 FL (ref 78–100)
MONOCYTES # BLD AUTO: 0.5 10E3/UL (ref 0–1.3)
MONOCYTES NFR BLD AUTO: 8 %
MUCOUS THREADS #/AREA URNS LPF: PRESENT /LPF
NEUTROPHILS # BLD AUTO: 3.6 10E3/UL (ref 1.6–8.3)
NEUTROPHILS NFR BLD AUTO: 54 %
NITRATE UR QL: NEGATIVE
NRBC # BLD AUTO: 0 10E3/UL
NRBC BLD AUTO-RTO: 0 /100
PH UR STRIP: 6 [PH] (ref 5–7)
PLATELET # BLD AUTO: 205 10E3/UL (ref 150–450)
POTASSIUM BLD-SCNC: 3.8 MMOL/L (ref 3.5–5)
PROT SERPL-MCNC: 7 G/DL (ref 6–8)
RBC # BLD AUTO: 4.31 10E6/UL (ref 3.8–5.2)
RBC URINE: 2 /HPF
SODIUM SERPL-SCNC: 137 MMOL/L (ref 136–145)
SP GR UR STRIP: 1.03 (ref 1–1.03)
SQUAMOUS EPITHELIAL: 3 /HPF
TRICHOMONAS, WET PREP: NORMAL
TROPONIN I SERPL-MCNC: <0.01 NG/ML (ref 0–0.29)
UROBILINOGEN UR STRIP-MCNC: <2 MG/DL
WBC # BLD AUTO: 6.7 10E3/UL (ref 4–11)
WBC URINE: 1 /HPF
WBC'S/HIGH POWER FIELD, WET PREP: NORMAL
YEAST, WET PREP: NORMAL

## 2022-02-25 PROCEDURE — 93971 EXTREMITY STUDY: CPT | Mod: RT

## 2022-02-25 PROCEDURE — 74177 CT ABD & PELVIS W/CONTRAST: CPT

## 2022-02-25 PROCEDURE — 81003 URINALYSIS AUTO W/O SCOPE: CPT | Performed by: STUDENT IN AN ORGANIZED HEALTH CARE EDUCATION/TRAINING PROGRAM

## 2022-02-25 PROCEDURE — 258N000003 HC RX IP 258 OP 636: Performed by: STUDENT IN AN ORGANIZED HEALTH CARE EDUCATION/TRAINING PROGRAM

## 2022-02-25 PROCEDURE — 71275 CT ANGIOGRAPHY CHEST: CPT

## 2022-02-25 PROCEDURE — 250N000011 HC RX IP 250 OP 636: Performed by: STUDENT IN AN ORGANIZED HEALTH CARE EDUCATION/TRAINING PROGRAM

## 2022-02-25 PROCEDURE — 96374 THER/PROPH/DIAG INJ IV PUSH: CPT | Mod: 59

## 2022-02-25 PROCEDURE — 85025 COMPLETE CBC W/AUTO DIFF WBC: CPT | Performed by: STUDENT IN AN ORGANIZED HEALTH CARE EDUCATION/TRAINING PROGRAM

## 2022-02-25 PROCEDURE — 84484 ASSAY OF TROPONIN QUANT: CPT | Performed by: STUDENT IN AN ORGANIZED HEALTH CARE EDUCATION/TRAINING PROGRAM

## 2022-02-25 PROCEDURE — 81025 URINE PREGNANCY TEST: CPT | Performed by: STUDENT IN AN ORGANIZED HEALTH CARE EDUCATION/TRAINING PROGRAM

## 2022-02-25 PROCEDURE — 250N000013 HC RX MED GY IP 250 OP 250 PS 637: Performed by: STUDENT IN AN ORGANIZED HEALTH CARE EDUCATION/TRAINING PROGRAM

## 2022-02-25 PROCEDURE — 96375 TX/PRO/DX INJ NEW DRUG ADDON: CPT | Mod: 59

## 2022-02-25 PROCEDURE — 96372 THER/PROPH/DIAG INJ SC/IM: CPT | Performed by: STUDENT IN AN ORGANIZED HEALTH CARE EDUCATION/TRAINING PROGRAM

## 2022-02-25 PROCEDURE — 83690 ASSAY OF LIPASE: CPT | Performed by: STUDENT IN AN ORGANIZED HEALTH CARE EDUCATION/TRAINING PROGRAM

## 2022-02-25 PROCEDURE — 93005 ELECTROCARDIOGRAM TRACING: CPT | Performed by: STUDENT IN AN ORGANIZED HEALTH CARE EDUCATION/TRAINING PROGRAM

## 2022-02-25 PROCEDURE — 36415 COLL VENOUS BLD VENIPUNCTURE: CPT | Performed by: STUDENT IN AN ORGANIZED HEALTH CARE EDUCATION/TRAINING PROGRAM

## 2022-02-25 PROCEDURE — 80053 COMPREHEN METABOLIC PANEL: CPT | Performed by: STUDENT IN AN ORGANIZED HEALTH CARE EDUCATION/TRAINING PROGRAM

## 2022-02-25 PROCEDURE — 96361 HYDRATE IV INFUSION ADD-ON: CPT

## 2022-02-25 PROCEDURE — 87210 SMEAR WET MOUNT SALINE/INK: CPT | Performed by: STUDENT IN AN ORGANIZED HEALTH CARE EDUCATION/TRAINING PROGRAM

## 2022-02-25 PROCEDURE — 99285 EMERGENCY DEPT VISIT HI MDM: CPT | Mod: 25

## 2022-02-25 RX ORDER — DOXYCYCLINE 100 MG/1
100 CAPSULE ORAL 2 TIMES DAILY
Qty: 14 CAPSULE | Refills: 0 | Status: SHIPPED | OUTPATIENT
Start: 2022-02-25 | End: 2022-03-04

## 2022-02-25 RX ORDER — ONDANSETRON 2 MG/ML
4 INJECTION INTRAMUSCULAR; INTRAVENOUS ONCE
Status: COMPLETED | OUTPATIENT
Start: 2022-02-25 | End: 2022-02-25

## 2022-02-25 RX ORDER — CYCLOBENZAPRINE HCL 10 MG
10 TABLET ORAL
Qty: 10 TABLET | Refills: 0 | Status: SHIPPED | OUTPATIENT
Start: 2022-02-25 | End: 2022-03-07

## 2022-02-25 RX ORDER — IOPAMIDOL 755 MG/ML
100 INJECTION, SOLUTION INTRAVASCULAR ONCE
Status: COMPLETED | OUTPATIENT
Start: 2022-02-25 | End: 2022-02-25

## 2022-02-25 RX ORDER — DOXYCYCLINE HYCLATE 50 MG/1
100 CAPSULE ORAL ONCE
Status: COMPLETED | OUTPATIENT
Start: 2022-02-25 | End: 2022-02-25

## 2022-02-25 RX ORDER — KETOROLAC TROMETHAMINE 15 MG/ML
15 INJECTION, SOLUTION INTRAMUSCULAR; INTRAVENOUS ONCE
Status: COMPLETED | OUTPATIENT
Start: 2022-02-25 | End: 2022-02-25

## 2022-02-25 RX ADMIN — ONDANSETRON 4 MG: 2 INJECTION INTRAMUSCULAR; INTRAVENOUS at 19:33

## 2022-02-25 RX ADMIN — CEFTRIAXONE SODIUM 500 MG: 500 INJECTION, POWDER, FOR SOLUTION INTRAMUSCULAR; INTRAVENOUS at 22:42

## 2022-02-25 RX ADMIN — IOPAMIDOL 100 ML: 755 INJECTION, SOLUTION INTRAVENOUS at 20:47

## 2022-02-25 RX ADMIN — DOXYCYCLINE HYCLATE 100 MG: 50 CAPSULE ORAL at 22:41

## 2022-02-25 RX ADMIN — SODIUM CHLORIDE, POTASSIUM CHLORIDE, SODIUM LACTATE AND CALCIUM CHLORIDE 1000 ML: 600; 310; 30; 20 INJECTION, SOLUTION INTRAVENOUS at 19:40

## 2022-02-25 RX ADMIN — KETOROLAC TROMETHAMINE 15 MG: 15 INJECTION, SOLUTION INTRAMUSCULAR; INTRAVENOUS at 19:34

## 2022-02-26 LAB
ATRIAL RATE - MUSE: 75 BPM
DIASTOLIC BLOOD PRESSURE - MUSE: NORMAL MMHG
INTERPRETATION ECG - MUSE: NORMAL
P AXIS - MUSE: 71 DEGREES
PR INTERVAL - MUSE: 164 MS
QRS DURATION - MUSE: 76 MS
QT - MUSE: 378 MS
QTC - MUSE: 422 MS
R AXIS - MUSE: 53 DEGREES
SYSTOLIC BLOOD PRESSURE - MUSE: NORMAL MMHG
T AXIS - MUSE: 60 DEGREES
VENTRICULAR RATE- MUSE: 75 BPM

## 2022-02-26 NOTE — ED NOTES
Pt presents with complaints of right flank pain that radiates to her back. Pt states that her symptoms started on 2/19/2022 and have increasingly worsened, today her pain was 7/10 and she felt she couldn't get out of bed. Pt denies SOB, fever and chills. Pt has intermittent nausea but no vomiting.

## 2022-02-26 NOTE — ED PROVIDER NOTES
Emergency Department Encounter         FINAL IMPRESSION:  Right flank pain        ED COURSE AND MEDICAL DECISION MAKING     7:05 PM I introduced myself to the patient, performed my physical exam, and discussed plan for ED workup.  9:29 PM Rechecked and updated the patient on laboratory and imaging results.  9:36 PM Patient is now concerned for STDs. She reports a new sexual partner, and last had sexual intercourse 10 days ago. Reports this sexual partner previously exposed her to herpes. Patient deciding if she would like to be swabbed and prophylactically treated for GC chlamydia.   10:05 PM Rechecked and updated the patient. We discussed the plan for discharge and the patient is agreeable. Reviewed supportive cares, symptomatic treatment, outpatient follow up, and reasons to return to the Emergency Department. Patient to be discharged by ED RN.     ED Course as of 02/25/22 2222 Fri Feb 25, 2022 1912 Patient is a 46 old history of PE not on anticoagulation, history of DVT, pelvic vein thrombosis, here from home with multiple complaints.  Patient states since Saturday almost 1 week now she has had right posterior inferior thoracic pain that is made worse with deep inspiration, also right flank pain with radiation down to the groin as well as right lower quadrant pain.  Also complaining of right lower extremity paresthesias.  Is a history of DVTs and PEs.  No anterior chest pain.  Mild nausea.  Also complaining of dysuria and a sensation like she is not emptying her bladder.  No vaginal bleeding or discharge.  Status post hysterectomy.  On arrival here she looks well per vitals are stable.  Heart and lungs are normal.  Patient with pain palpation of the right flank.  No rash.  Patient also with right lower quadrant pain to palpation.  Right lower extremity with no edema compared to left.  Patient endorsing paresthesias with palpation.  Normal strength.  Normal pulses.  Soft compartments.  Plan for broad work-up  including CT PE, CT abdomen pelvis, labs fluids ultrasound reevaluate   1939 EKG is sinus rhythm rate of 75, no STEMI, no inversions no depressions .     -CT is normal.  Ultrasound normal.  Labs normal.  Reevaluated patient who states now she is concerned about STDs.  States 2 days ago she had may be some heavier vaginal discharge however unsure.  Discussed prophylactically treating her for GC chlamydia and she states she would like that.  Wet prep was sent off from a self swab which was normal.  She was sent home with doxycycline for 7 days to cover chlamydia and Flexeril for her right flank pain.               At the conclusion of the encounter I discussed the results of all the tests and the disposition. The questions were answered. The patient or family acknowledged understanding and was agreeable with the care plan.                  MEDICATIONS GIVEN IN THE EMERGENCY DEPARTMENT:  Medications   lactated ringers BOLUS 1,000 mL (0 mLs Intravenous Stopped 2/25/22 2201)   ketorolac (TORADOL) injection 15 mg (15 mg Intravenous Given 2/25/22 1934)   ondansetron (ZOFRAN) injection 4 mg (4 mg Intravenous Given 2/25/22 1933)   iopamidol (ISOVUE-370) solution 100 mL (100 mLs Intravenous Given 2/25/22 2047)   cefTRIAXone (ROCEPHIN) injection 500 mg (500 mg Intramuscular Given 2/25/22 2242)   doxycycline hyclate (VIBRAMYCIN) capsule 100 mg (100 mg Oral Given 2/25/22 2241)       NEW PRESCRIPTIONS STARTED AT TODAY'S ED VISIT:  New Prescriptions    CYCLOBENZAPRINE (FLEXERIL) 10 MG TABLET    Take 1 tablet (10 mg) by mouth nightly as needed for muscle spasms    DOXYCYCLINE HYCLATE (VIBRAMYCIN) 100 MG CAPSULE    Take 1 capsule (100 mg) by mouth 2 times daily for 7 days       HPI     Patient information obtained from: Patient    Use of Interpretor: N/A     Renetta SHARON Sears is a 46 year old female with a pertinent history of urethritis, genital herpes, PE, and s/p cholecystectomy and hysterectomy who presents to this ED by  walk in for evaluation of flank pain.    The patient reports 6 days of ongoing right flank pain that radiates to her right lower quadrant. The pain is exacerbated by deep breathing. She endorses associated nausea, denies vomiting. She also feels she is not emptying her bladder when she voids. She denies dysuria, hematuria, and fever. Since onset of the pain she has also felt constipated. She has tried magnesium citrate, increased water intake, and coffee without improvement of her constipation. She did have a bowel movement yesterday, but still feels constipated. She also endorses tingling in her right leg. She has not noticed any leg swelling. Denies concern for STDs. Denies chest pain, vaginal bleeding, vaginal discharge, and any other complaints at this time.    9:39 PM Addendum. Patient now expressed concern for STDs as she has had a new sexual partner. Last sexual intercourse was 10 days ago.    REVIEW OF SYSTEMS:  Review of Systems   Constitutional: Negative for fever, malaise  HEENT: Negative runny nose, sore throat, ear pain, neck pain  Respiratory: Negative for shortness of breath, cough, congestion  Cardiovascular: Negative for chest pain, leg edema  Gastrointestinal: Negative for abdominal distention vomiting, diarrhea. Positive for constipation abdominal pain (RLQ), nausea.  Genitourinary: Negative for dysuria, hematuria, vaginal bleeding, vaginal discharge. Positive for difficulty emptying bladder, right flank pain.  Integument: Negative for rash, skin breakdown  Neurological: Negative for paresthesias, weakness, headache. Endorses right lower extremity tingling.  Musculoskeletal: Negative for joint pain, joint swelling      All other systems reviewed and are negative.          MEDICAL HISTORY     Past Medical History:   Diagnosis Date     Acute cholecystitis      Depression      Esophagitis medicamentosa      Other pulmonary embolism and infarction 2000       Past Surgical History:   Procedure  "Laterality Date     CERVIX SURGERY  1996    \"repair\"     CHOLECYSTECTOMY       HC REMOVE TONSILS/ADENOIDS,12+ Y/O  2004    T & A 14+y.o.     HERNIA REPAIR       HYSTERECTOMY Bilateral 11/12/2019    Procedure: TOTAL LAPAROSCOPIC HYSTERECTOMY BILATERAL SALPINGECTOMY CYSTOSCOPY;  Surgeon: Sirena Dunlap MD;  Location: [facility];  Service: Gynecology     LAPAROSCOPIC CHOLECYSTECTOMY      Cholecystectomy, Laparoscopic     SURGICAL HISTORY OF -   7/01    Tubal ligation     TUBAL LIGATION         Social History     Tobacco Use     Smoking status: Never Smoker     Smokeless tobacco: Never Used   Substance Use Topics     Alcohol use: Yes     Comment: occasionally     Drug use: Never       cyclobenzaprine (FLEXERIL) 10 MG tablet  doxycycline hyclate (VIBRAMYCIN) 100 MG capsule  Cholecalciferol (VITAMIN D3 PO)  hydrOXYzine (ATARAX) 25 MG tablet  ibuprofen (ADVIL/MOTRIN) 600 MG tablet  MAGNESIUM OXIDE PO  methylPREDNISolone (MEDROL DOSEPAK) 4 MG tablet therapy pack  Multiple Vitamins-Minerals (MULTIVITAL PO)  oxyCODONE (ROXICODONE) 5 MG tablet  valACYclovir (VALTREX) 1000 mg tablet            PHYSICAL EXAM     /57   Pulse 80   Temp 99.5  F (37.5  C) (Oral)   Resp 30   Wt 70.3 kg (155 lb)   LMP 09/29/2019 (Exact Date)   SpO2 100%   BMI 22.89 kg/m        PHYSICAL EXAM:     General: Patient appears well, nontoxic, comfortable  HEENT: Moist mucous membranes, no tongue swelling.  No head trauma.  No midline neck pain.  Cardiovascular: Normal rate, normal rhythm, no extremity edema.  No appreciable murmur.  Respiratory: No signs of respiratory distress, lungs are clear to auscultation bilaterally with no wheezes rhonchi or rales.  Abdominal: Soft, RLQ tenderness, nondistended, no palpable masses, no guarding, no rebound  Back: Pain with palpation of right flank.  Musculoskeletal: Full range of motion of joints, no deformities appreciated. Right lower extremity without edema compared to left lower " extremity. Endorses right lower extremity paraesthesia with palpation, normal strength, normal pulses, soft compartments.  Neurological: Alert and oriented, grossly neurologically intact.  Psychological: Normal affect and mood.  Integument: No rashes appreciated        RESULTS       Labs Ordered and Resulted from Time of ED Arrival to Time of ED Departure   COMPREHENSIVE METABOLIC PANEL - Abnormal       Result Value    Sodium 137      Potassium 3.8      Chloride 105      Carbon Dioxide (CO2) 22      Anion Gap 10      Urea Nitrogen 7 (*)     Creatinine 0.70      Calcium 9.1      Glucose 97      Alkaline Phosphatase 49      AST 15      ALT 20      Protein Total 7.0      Albumin 4.1      Bilirubin Total 1.0      GFR Estimate >90     ROUTINE UA WITH MICROSCOPIC REFLEX TO CULTURE - Abnormal    Color Urine Light Yellow      Appearance Urine Clear      Glucose Urine Negative      Bilirubin Urine Negative      Ketones Urine Negative      Specific Gravity Urine 1.028      Blood Urine Negative      pH Urine 6.0      Protein Albumin Urine Negative      Urobilinogen Urine <2.0      Nitrite Urine Negative      Leukocyte Esterase Urine Negative      Bacteria Urine Few (*)     Mucus Urine Present (*)     RBC Urine 2      WBC Urine 1      Squamous Epithelials Urine 3 (*)    LIPASE - Normal    Lipase 11     TROPONIN I - Normal    Troponin I <0.01     HCG QUALITATIVE URINE - Normal    hCG Urine Qualitative Negative     WET PREPARATION - Normal    Trichomonas Absent      Yeast Absent      Clue Cells Absent      WBCs/high power field None     CBC WITH PLATELETS AND DIFFERENTIAL    WBC Count 6.7      RBC Count 4.31      Hemoglobin 13.8      Hematocrit 40.8      MCV 95      MCH 32.0      MCHC 33.8      RDW 12.3      Platelet Count 205      % Neutrophils 54      % Lymphocytes 36      % Monocytes 8      % Eosinophils 2      % Basophils 0      % Immature Granulocytes 0      NRBCs per 100 WBC 0      Absolute Neutrophils 3.6      Absolute  Lymphocytes 2.4      Absolute Monocytes 0.5      Absolute Eosinophils 0.1      Absolute Basophils 0.0      Absolute Immature Granulocytes 0.0      Absolute NRBCs 0.0         US Lower Extremity Venous Duplex Right   Final Result   IMPRESSION:   1.  No deep venous thrombosis in the right lower extremity.      CT Abdomen Pelvis w Contrast   Final Result   IMPRESSION:    1.  No hydronephrosis or hydroureter. No ureteral calculi identified.   2.  The appendix is not discretely visualized.   3.  Status post hysterectomy.       CT Chest Pulmonary Embolism w Contrast   Final Result   IMPRESSION:   1.  No pulmonary emboli. No etiology for symptoms evident.                        PROCEDURES:  Procedures:  Procedures       I, Alen Stevens am serving as a scribe to document services personally performed by Eldon Jeffers DO, based on my observations and the provider's statements to me.  I, Eldon Jeffers DO, attest that Alen Stevens is acting in a scribe capacity, has observed my performance of the services and has documented them in accordance with my direction.    Eldon Jeffers DO  Emergency Medicine  [facility] EMERGENCY ROOM     OhEldon sawyer DO  02/25/22 0498

## 2022-02-26 NOTE — ED TRIAGE NOTES
Pt presents to the ED with c/o right sided back/flank pain since this morning. Denies fevers. Slightly nauseated without emesis.

## 2022-05-04 ENCOUNTER — ANCILLARY PROCEDURE (OUTPATIENT)
Dept: CT IMAGING | Facility: CLINIC | Age: 47
End: 2022-05-04
Attending: INTERNAL MEDICINE
Payer: COMMERCIAL

## 2022-05-04 DIAGNOSIS — K46.9 ABDOMINAL HERNIA: ICD-10-CM

## 2022-05-04 PROCEDURE — 74176 CT ABD & PELVIS W/O CONTRAST: CPT | Performed by: RADIOLOGY

## 2022-05-12 ENCOUNTER — TRANSFERRED RECORDS (OUTPATIENT)
Dept: SURGERY | Facility: CLINIC | Age: 47
End: 2022-05-12

## 2022-05-28 PROCEDURE — 99283 EMERGENCY DEPT VISIT LOW MDM: CPT

## 2022-05-29 ENCOUNTER — HOSPITAL ENCOUNTER (EMERGENCY)
Facility: CLINIC | Age: 47
Discharge: HOME | End: 2022-05-29
Attending: EMERGENCY MEDICINE | Admitting: EMERGENCY MEDICINE
Payer: COMMERCIAL

## 2022-05-29 VITALS
SYSTOLIC BLOOD PRESSURE: 123 MMHG | OXYGEN SATURATION: 99 % | RESPIRATION RATE: 18 BRPM | TEMPERATURE: 98.4 F | DIASTOLIC BLOOD PRESSURE: 82 MMHG | HEART RATE: 78 BPM

## 2022-05-29 DIAGNOSIS — J01.01 ACUTE RECURRENT MAXILLARY SINUSITIS: ICD-10-CM

## 2022-05-29 LAB
FLUAV RNA SPEC QL NAA+PROBE: NEGATIVE
FLUBV RNA RESP QL NAA+PROBE: NEGATIVE
RSV RNA SPEC NAA+PROBE: NEGATIVE
SARS-COV-2 RNA RESP QL NAA+PROBE: NEGATIVE

## 2022-05-29 PROCEDURE — C9803 HOPD COVID-19 SPEC COLLECT: HCPCS

## 2022-05-29 PROCEDURE — 87637 SARSCOV2&INF A&B&RSV AMP PRB: CPT | Performed by: EMERGENCY MEDICINE

## 2022-05-29 RX ORDER — AZITHROMYCIN 250 MG/1
TABLET, FILM COATED ORAL
Qty: 6 TABLET | Refills: 0 | Status: ON HOLD | OUTPATIENT
Start: 2022-05-29 | End: 2022-07-07

## 2022-05-29 RX ORDER — FLUCONAZOLE 150 MG/1
150 TABLET ORAL
Qty: 2 TABLET | Refills: 0 | Status: SHIPPED | OUTPATIENT
Start: 2022-05-29 | End: 2022-06-06

## 2022-05-29 ASSESSMENT — ENCOUNTER SYMPTOMS
MYALGIAS: 0
SHORTNESS OF BREATH: 0
VOMITING: 0
DIARRHEA: 0
HEADACHES: 1
COUGH: 1
RHINORRHEA: 1
FEVER: 0

## 2022-05-29 NOTE — DISCHARGE INSTRUCTIONS
Prescription strength dosing instructions:  - 600mg ibuprofen (Advil, Motrin) every 6 hours as needed for fever/pain/inflammation.  Naprosyn (Naproxen, Aleve) works similarly and can be used instead, if preferred.  - 650mg acetaminophen (tylenol) every 4-6 hours or 1000mg every 6-8 hours (maximum of 3000mg in 24 hours).  Acetaminophen is often in combination over the counter and prescription pain medications.  Be sure to include this in daily total.    These medications work differently and can be used in combination.

## 2022-05-29 NOTE — ED PROVIDER NOTES
History   Chief Complaint:  Facial Pain       HPI   Paola Sears is a 46 year old female with history of sinus infections who presents alone for evaluation of congestion. The patient reports onset of sinus congestion 4 days ago. She notes associated headache, rhinorrhea and coughing up phlegm. She has tried laila pot and other OTC remedies but has not had any relief. She took a home Covid test which was negative. She notes that she has had sinus infections before and a z pack usually works for her. The patient denies myalgias, fever, shortness of breath, vomiting or diarrhea. She is not vaccinated against Covid. She notes she recently found out she has an autoimmune disease but is not sure what it is and is in the middle of further evaluation.      Review of Systems   Constitutional: Negative for fever.   HENT: Positive for congestion and rhinorrhea.    Respiratory: Positive for cough. Negative for shortness of breath.    Gastrointestinal: Negative for diarrhea and vomiting.   Musculoskeletal: Negative for myalgias.   Neurological: Positive for headaches.   All other systems reviewed and are negative.      Allergies:  Estrogens    Medications:  Zyrtec  Singulair  Valtrex  Iron pills     Past Medical History:     Sinus infections   Abdominal hernia   Autoimmune disease     Social History:  The patient presents to the ED alone.       Physical Exam     Patient Vitals for the past 24 hrs:   BP Temp Temp src Pulse Resp SpO2   05/28/22 2348 106/57 98.4  F (36.9  C) Oral 77 20 98 %       Physical Exam  Resp:               Non-labored  Neuro:             Alert and cooperative  MSkel:             Moving all extremities  Skin:                No rash       Emergency Department Course     Laboratory:  Labs Ordered and Resulted from Time of ED Arrival to Time of ED Departure - No data to display         Emergency Department Course:             Reviewed:  I reviewed nursing notes, vitals and past medical  history    Assessments:  0155 I obtained history and examined the patient as noted above. Plan was discussed and agreed upon at this time.    Interventions:  Medications - No data to display      Disposition:  The patient was discharged to home.     Impression & Plan     Medical Decision Making:  Discussed retesting for COVID and influenza today which was declined.  Even if past but likely would not be able to start antivirals by day 5 based on day of presentation and availability of anti-viral.  Has a history of recurrent sinusitis requiring antibiotics.  I discussed with her the recommendation to wait until at least day 7 prior to starting antibiotics.  Z-Kirt was prescribed.  Over-the-counter decongestants discussed.      Diagnosis:    ICD-10-CM    1. Acute recurrent maxillary sinusitis  J01.01        Discharge Medications:  New Prescriptions    AZITHROMYCIN (ZITHROMAX) 250 MG TABLET    500mg (2 tabs) on Day 1 then 250mg (1 tab) on Days 2-5       Scribe Disclosure:  I, Jerry Fournier, am serving as a scribe at 1:55 AM on 5/29/2022 to document services personally performed by Verna Garcia MD based on my observations and the provider's statements to me.            Verna Garcia MD  05/29/22 [id number]

## 2022-05-29 NOTE — ED TRIAGE NOTES
Patient here with a sinus pressure ongoing for 4 days.  She had a negative home covid test today

## 2022-07-02 RX ORDER — SULFAMETHOXAZOLE/TRIMETHOPRIM 800-160 MG
1 TABLET ORAL 2 TIMES DAILY
Status: ON HOLD | COMMUNITY
End: 2022-07-07

## 2022-07-02 RX ORDER — SPIRONOLACTONE 50 MG/1
50 TABLET, FILM COATED ORAL DAILY
COMMUNITY

## 2022-07-02 RX ORDER — CETIRIZINE HYDROCHLORIDE 10 MG/1
10 TABLET ORAL DAILY
COMMUNITY

## 2022-07-02 RX ORDER — AMITRIPTYLINE HYDROCHLORIDE 10 MG/1
10 TABLET ORAL AT BEDTIME
COMMUNITY

## 2022-07-02 RX ORDER — MULTIVIT-MIN/IRON/FOLIC ACID/K 18-600-40
CAPSULE ORAL
COMMUNITY

## 2022-07-02 RX ORDER — CHLORAL HYDRATE 500 MG
2 CAPSULE ORAL DAILY
COMMUNITY

## 2022-07-02 RX ORDER — FLUCONAZOLE 150 MG/1
150 TABLET ORAL ONCE
COMMUNITY

## 2022-07-02 RX ORDER — DOXYCYCLINE 100 MG/1
CAPSULE ORAL 2 TIMES DAILY
COMMUNITY

## 2022-07-02 RX ORDER — MONTELUKAST SODIUM 10 MG/1
10 TABLET ORAL AT BEDTIME
COMMUNITY

## 2022-07-02 RX ORDER — CYCLOBENZAPRINE HCL 10 MG
10 TABLET ORAL 3 TIMES DAILY PRN
COMMUNITY
End: 2023-04-02

## 2022-07-02 RX ORDER — HYDROCODONE BITARTRATE AND ACETAMINOPHEN 5; 325 MG/1; MG/1
1 TABLET ORAL EVERY 6 HOURS PRN
Status: ON HOLD | COMMUNITY
End: 2022-07-07

## 2022-07-02 RX ORDER — FERROUS SULFATE 325(65) MG
325 TABLET ORAL
COMMUNITY

## 2022-07-05 ENCOUNTER — LAB (OUTPATIENT)
Dept: LAB | Facility: CLINIC | Age: 47
End: 2022-07-05
Payer: COMMERCIAL

## 2022-07-05 ENCOUNTER — DOCUMENTATION ONLY (OUTPATIENT)
Dept: OTHER | Facility: CLINIC | Age: 47
End: 2022-07-05

## 2022-07-05 DIAGNOSIS — Z20.822 ENCOUNTER FOR LABORATORY TESTING FOR COVID-19 VIRUS: ICD-10-CM

## 2022-07-05 PROCEDURE — U0003 INFECTIOUS AGENT DETECTION BY NUCLEIC ACID (DNA OR RNA); SEVERE ACUTE RESPIRATORY SYNDROME CORONAVIRUS 2 (SARS-COV-2) (CORONAVIRUS DISEASE [COVID-19]), AMPLIFIED PROBE TECHNIQUE, MAKING USE OF HIGH THROUGHPUT TECHNOLOGIES AS DESCRIBED BY CMS-2020-01-R: HCPCS

## 2022-07-05 PROCEDURE — U0005 INFEC AGEN DETEC AMPLI PROBE: HCPCS

## 2022-07-06 LAB — SARS-COV-2 RNA RESP QL NAA+PROBE: NEGATIVE

## 2022-07-07 ENCOUNTER — ANESTHESIA (OUTPATIENT)
Dept: SURGERY | Facility: CLINIC | Age: 47
End: 2022-07-07
Payer: COMMERCIAL

## 2022-07-07 ENCOUNTER — HOSPITAL ENCOUNTER (OUTPATIENT)
Facility: CLINIC | Age: 47
Discharge: HOME | End: 2022-07-07
Attending: SURGERY | Admitting: SURGERY
Payer: COMMERCIAL

## 2022-07-07 ENCOUNTER — ANESTHESIA EVENT (OUTPATIENT)
Dept: SURGERY | Facility: CLINIC | Age: 47
End: 2022-07-07
Payer: COMMERCIAL

## 2022-07-07 VITALS
BODY MASS INDEX: 27.16 KG/M2 | HEART RATE: 99 BPM | RESPIRATION RATE: 16 BRPM | SYSTOLIC BLOOD PRESSURE: 114 MMHG | OXYGEN SATURATION: 99 % | TEMPERATURE: 98.7 F | WEIGHT: 169 LBS | DIASTOLIC BLOOD PRESSURE: 81 MMHG | HEIGHT: 66 IN

## 2022-07-07 DIAGNOSIS — K43.9 VENTRAL HERNIA WITHOUT OBSTRUCTION OR GANGRENE: Primary | ICD-10-CM

## 2022-07-07 PROCEDURE — 250N000009 HC RX 250: Performed by: NURSE ANESTHETIST, CERTIFIED REGISTERED

## 2022-07-07 PROCEDURE — 272N000001 HC OR GENERAL SUPPLY STERILE: Performed by: SURGERY

## 2022-07-07 PROCEDURE — 250N000011 HC RX IP 250 OP 636: Performed by: NURSE ANESTHETIST, CERTIFIED REGISTERED

## 2022-07-07 PROCEDURE — 250N000011 HC RX IP 250 OP 636: Performed by: ANESTHESIOLOGY

## 2022-07-07 PROCEDURE — 250N000011 HC RX IP 250 OP 636: Performed by: SURGERY

## 2022-07-07 PROCEDURE — 250N000009 HC RX 250: Performed by: SURGERY

## 2022-07-07 PROCEDURE — C1781 MESH (IMPLANTABLE): HCPCS | Performed by: SURGERY

## 2022-07-07 PROCEDURE — 370N000017 HC ANESTHESIA TECHNICAL FEE, PER MIN: Performed by: SURGERY

## 2022-07-07 PROCEDURE — 710N000012 HC RECOVERY PHASE 2, PER MINUTE: Performed by: SURGERY

## 2022-07-07 PROCEDURE — 710N000009 HC RECOVERY PHASE 1, LEVEL 1, PER MIN: Performed by: SURGERY

## 2022-07-07 PROCEDURE — 250N000025 HC SEVOFLURANE, PER MIN: Performed by: SURGERY

## 2022-07-07 PROCEDURE — 250N000013 HC RX MED GY IP 250 OP 250 PS 637: Performed by: SURGERY

## 2022-07-07 PROCEDURE — 258N000003 HC RX IP 258 OP 636: Performed by: NURSE ANESTHETIST, CERTIFIED REGISTERED

## 2022-07-07 PROCEDURE — P9041 ALBUMIN (HUMAN),5%, 50ML: HCPCS | Performed by: NURSE ANESTHETIST, CERTIFIED REGISTERED

## 2022-07-07 PROCEDURE — 360N000080 HC SURGERY LEVEL 7, PER MIN: Performed by: SURGERY

## 2022-07-07 PROCEDURE — 999N000141 HC STATISTIC PRE-PROCEDURE NURSING ASSESSMENT: Performed by: SURGERY

## 2022-07-07 DEVICE — IMPLANTABLE DEVICE: Type: IMPLANTABLE DEVICE | Site: ABDOMEN | Status: FUNCTIONAL

## 2022-07-07 RX ORDER — DIMENHYDRINATE 50 MG/ML
25 INJECTION, SOLUTION INTRAMUSCULAR; INTRAVENOUS
Status: DISCONTINUED | OUTPATIENT
Start: 2022-07-07 | End: 2022-07-07 | Stop reason: HOSPADM

## 2022-07-07 RX ORDER — ACETAMINOPHEN 325 MG/1
975 TABLET ORAL EVERY 8 HOURS PRN
Status: DISCONTINUED | OUTPATIENT
Start: 2022-07-07 | End: 2022-07-07 | Stop reason: HOSPADM

## 2022-07-07 RX ORDER — HYDROXYZINE HYDROCHLORIDE 25 MG/1
25 TABLET, FILM COATED ORAL EVERY 6 HOURS PRN
Status: DISCONTINUED | OUTPATIENT
Start: 2022-07-07 | End: 2022-07-07 | Stop reason: HOSPADM

## 2022-07-07 RX ORDER — HYDRALAZINE HYDROCHLORIDE 20 MG/ML
2.5-5 INJECTION INTRAMUSCULAR; INTRAVENOUS EVERY 10 MIN PRN
Status: DISCONTINUED | OUTPATIENT
Start: 2022-07-07 | End: 2022-07-07 | Stop reason: HOSPADM

## 2022-07-07 RX ORDER — HYDROMORPHONE HCL IN WATER/PF 6 MG/30 ML
0.2 PATIENT CONTROLLED ANALGESIA SYRINGE INTRAVENOUS EVERY 5 MIN PRN
Status: DISCONTINUED | OUTPATIENT
Start: 2022-07-07 | End: 2022-07-07 | Stop reason: HOSPADM

## 2022-07-07 RX ORDER — ONDANSETRON 2 MG/ML
INJECTION INTRAMUSCULAR; INTRAVENOUS PRN
Status: DISCONTINUED | OUTPATIENT
Start: 2022-07-07 | End: 2022-07-07

## 2022-07-07 RX ORDER — BUPIVACAINE HYDROCHLORIDE AND EPINEPHRINE 5; 5 MG/ML; UG/ML
INJECTION, SOLUTION EPIDURAL; INTRACAUDAL; PERINEURAL PRN
Status: DISCONTINUED | OUTPATIENT
Start: 2022-07-07 | End: 2022-07-07 | Stop reason: HOSPADM

## 2022-07-07 RX ORDER — ONDANSETRON 4 MG/1
4 TABLET, ORALLY DISINTEGRATING ORAL
Status: DISCONTINUED | OUTPATIENT
Start: 2022-07-07 | End: 2022-07-07 | Stop reason: HOSPADM

## 2022-07-07 RX ORDER — ALBUMIN, HUMAN INJ 5% 5 %
SOLUTION INTRAVENOUS CONTINUOUS PRN
Status: DISCONTINUED | OUTPATIENT
Start: 2022-07-07 | End: 2022-07-07

## 2022-07-07 RX ORDER — MAGNESIUM HYDROXIDE 1200 MG/15ML
LIQUID ORAL PRN
Status: DISCONTINUED | OUTPATIENT
Start: 2022-07-07 | End: 2022-07-07 | Stop reason: HOSPADM

## 2022-07-07 RX ORDER — ONDANSETRON 4 MG/1
4 TABLET, ORALLY DISINTEGRATING ORAL EVERY 30 MIN PRN
Status: DISCONTINUED | OUTPATIENT
Start: 2022-07-07 | End: 2022-07-07 | Stop reason: HOSPADM

## 2022-07-07 RX ORDER — CEFAZOLIN SODIUM/WATER 2 G/20 ML
2 SYRINGE (ML) INTRAVENOUS
Status: COMPLETED | OUTPATIENT
Start: 2022-07-07 | End: 2022-07-07

## 2022-07-07 RX ORDER — LIDOCAINE HYDROCHLORIDE 20 MG/ML
INJECTION, SOLUTION INFILTRATION; PERINEURAL PRN
Status: DISCONTINUED | OUTPATIENT
Start: 2022-07-07 | End: 2022-07-07

## 2022-07-07 RX ORDER — OXYCODONE HYDROCHLORIDE 5 MG/1
5 TABLET ORAL EVERY 4 HOURS PRN
Status: DISCONTINUED | OUTPATIENT
Start: 2022-07-07 | End: 2022-07-07 | Stop reason: HOSPADM

## 2022-07-07 RX ORDER — KETOROLAC TROMETHAMINE 30 MG/ML
30 INJECTION, SOLUTION INTRAMUSCULAR; INTRAVENOUS ONCE
Status: COMPLETED | OUTPATIENT
Start: 2022-07-07 | End: 2022-07-07

## 2022-07-07 RX ORDER — DEXMEDETOMIDINE HYDROCHLORIDE 4 UG/ML
INJECTION, SOLUTION INTRAVENOUS PRN
Status: DISCONTINUED | OUTPATIENT
Start: 2022-07-07 | End: 2022-07-07

## 2022-07-07 RX ORDER — TRAMADOL HYDROCHLORIDE 50 MG/1
50 TABLET ORAL EVERY 6 HOURS PRN
Qty: 20 TABLET | Refills: 0 | Status: SHIPPED | OUTPATIENT
Start: 2022-07-07 | End: 2022-07-12

## 2022-07-07 RX ORDER — CEFAZOLIN SODIUM/WATER 2 G/20 ML
2 SYRINGE (ML) INTRAVENOUS SEE ADMIN INSTRUCTIONS
Status: DISCONTINUED | OUTPATIENT
Start: 2022-07-07 | End: 2022-07-07 | Stop reason: HOSPADM

## 2022-07-07 RX ORDER — DEXAMETHASONE SODIUM PHOSPHATE 4 MG/ML
INJECTION, SOLUTION INTRA-ARTICULAR; INTRALESIONAL; INTRAMUSCULAR; INTRAVENOUS; SOFT TISSUE PRN
Status: DISCONTINUED | OUTPATIENT
Start: 2022-07-07 | End: 2022-07-07

## 2022-07-07 RX ORDER — ACETAMINOPHEN 325 MG/1
975 TABLET ORAL ONCE
Status: DISCONTINUED | OUTPATIENT
Start: 2022-07-07 | End: 2022-07-07 | Stop reason: HOSPADM

## 2022-07-07 RX ORDER — METHOCARBAMOL 750 MG/1
750 TABLET, FILM COATED ORAL
Status: DISCONTINUED | OUTPATIENT
Start: 2022-07-07 | End: 2022-07-07 | Stop reason: HOSPADM

## 2022-07-07 RX ORDER — LABETALOL HYDROCHLORIDE 5 MG/ML
10 INJECTION, SOLUTION INTRAVENOUS
Status: DISCONTINUED | OUTPATIENT
Start: 2022-07-07 | End: 2022-07-07 | Stop reason: HOSPADM

## 2022-07-07 RX ORDER — PROPOFOL 10 MG/ML
INJECTION, EMULSION INTRAVENOUS PRN
Status: DISCONTINUED | OUTPATIENT
Start: 2022-07-07 | End: 2022-07-07

## 2022-07-07 RX ORDER — FENTANYL CITRATE 50 UG/ML
INJECTION, SOLUTION INTRAMUSCULAR; INTRAVENOUS PRN
Status: DISCONTINUED | OUTPATIENT
Start: 2022-07-07 | End: 2022-07-07

## 2022-07-07 RX ORDER — HEPARIN SODIUM 5000 [USP'U]/.5ML
5000 INJECTION, SOLUTION INTRAVENOUS; SUBCUTANEOUS
Status: COMPLETED | OUTPATIENT
Start: 2022-07-07 | End: 2022-07-07

## 2022-07-07 RX ORDER — ACETAMINOPHEN 325 MG/1
650 TABLET ORAL EVERY 4 HOURS PRN
Qty: 50 TABLET | Refills: 0 | Status: SHIPPED | OUTPATIENT
Start: 2022-07-07

## 2022-07-07 RX ORDER — PROPOFOL 10 MG/ML
INJECTION, EMULSION INTRAVENOUS CONTINUOUS PRN
Status: DISCONTINUED | OUTPATIENT
Start: 2022-07-07 | End: 2022-07-07

## 2022-07-07 RX ORDER — SODIUM CHLORIDE, SODIUM LACTATE, POTASSIUM CHLORIDE, CALCIUM CHLORIDE 600; 310; 30; 20 MG/100ML; MG/100ML; MG/100ML; MG/100ML
INJECTION, SOLUTION INTRAVENOUS CONTINUOUS
Status: DISCONTINUED | OUTPATIENT
Start: 2022-07-07 | End: 2022-07-07 | Stop reason: HOSPADM

## 2022-07-07 RX ORDER — FENTANYL CITRATE 0.05 MG/ML
50 INJECTION, SOLUTION INTRAMUSCULAR; INTRAVENOUS EVERY 5 MIN PRN
Status: DISCONTINUED | OUTPATIENT
Start: 2022-07-07 | End: 2022-07-07 | Stop reason: HOSPADM

## 2022-07-07 RX ORDER — ONDANSETRON 2 MG/ML
4 INJECTION INTRAMUSCULAR; INTRAVENOUS EVERY 30 MIN PRN
Status: DISCONTINUED | OUTPATIENT
Start: 2022-07-07 | End: 2022-07-07 | Stop reason: HOSPADM

## 2022-07-07 RX ORDER — SODIUM CHLORIDE, SODIUM LACTATE, POTASSIUM CHLORIDE, CALCIUM CHLORIDE 600; 310; 30; 20 MG/100ML; MG/100ML; MG/100ML; MG/100ML
INJECTION, SOLUTION INTRAVENOUS CONTINUOUS PRN
Status: DISCONTINUED | OUTPATIENT
Start: 2022-07-07 | End: 2022-07-07

## 2022-07-07 RX ORDER — EPINEPHRINE 1 MG/ML(1)
AMPUL (ML) INJECTION PRN
Status: DISCONTINUED | OUTPATIENT
Start: 2022-07-07 | End: 2022-07-07 | Stop reason: HOSPADM

## 2022-07-07 RX ORDER — OXYCODONE HYDROCHLORIDE 5 MG/1
5 TABLET ORAL
Status: COMPLETED | OUTPATIENT
Start: 2022-07-07 | End: 2022-07-07

## 2022-07-07 RX ADMIN — PHENYLEPHRINE HYDROCHLORIDE 100 MCG: 10 INJECTION INTRAVENOUS at 14:22

## 2022-07-07 RX ADMIN — ALBUMIN HUMAN: 0.05 INJECTION, SOLUTION INTRAVENOUS at 14:17

## 2022-07-07 RX ADMIN — MIDAZOLAM 2 MG: 1 INJECTION INTRAMUSCULAR; INTRAVENOUS at 12:03

## 2022-07-07 RX ADMIN — PHENYLEPHRINE HYDROCHLORIDE 100 MCG: 10 INJECTION INTRAVENOUS at 14:49

## 2022-07-07 RX ADMIN — HEPARIN SODIUM 5000 UNITS: 5000 INJECTION, SOLUTION INTRAVENOUS; SUBCUTANEOUS at 11:03

## 2022-07-07 RX ADMIN — OXYCODONE HYDROCHLORIDE 5 MG: 5 TABLET ORAL at 17:27

## 2022-07-07 RX ADMIN — DEXMEDETOMIDINE HYDROCHLORIDE 8 MCG: 200 INJECTION INTRAVENOUS at 14:24

## 2022-07-07 RX ADMIN — PHENYLEPHRINE HYDROCHLORIDE 100 MCG: 10 INJECTION INTRAVENOUS at 13:34

## 2022-07-07 RX ADMIN — PHENYLEPHRINE HYDROCHLORIDE 100 MCG: 10 INJECTION INTRAVENOUS at 12:59

## 2022-07-07 RX ADMIN — PHENYLEPHRINE HYDROCHLORIDE 50 MCG: 10 INJECTION INTRAVENOUS at 12:40

## 2022-07-07 RX ADMIN — Medication 2 G: at 12:03

## 2022-07-07 RX ADMIN — PHENYLEPHRINE HYDROCHLORIDE 100 MCG: 10 INJECTION INTRAVENOUS at 12:47

## 2022-07-07 RX ADMIN — PHENYLEPHRINE HYDROCHLORIDE 100 MCG: 10 INJECTION INTRAVENOUS at 13:22

## 2022-07-07 RX ADMIN — SUGAMMADEX 200 MG: 100 INJECTION, SOLUTION INTRAVENOUS at 15:55

## 2022-07-07 RX ADMIN — FENTANYL CITRATE 50 MCG: 50 INJECTION, SOLUTION INTRAMUSCULAR; INTRAVENOUS at 16:32

## 2022-07-07 RX ADMIN — PHENYLEPHRINE HYDROCHLORIDE 100 MCG: 10 INJECTION INTRAVENOUS at 14:06

## 2022-07-07 RX ADMIN — PHENYLEPHRINE HYDROCHLORIDE 100 MCG: 10 INJECTION INTRAVENOUS at 15:04

## 2022-07-07 RX ADMIN — ACETAMINOPHEN 975 MG: 325 TABLET ORAL at 17:26

## 2022-07-07 RX ADMIN — PHENYLEPHRINE HYDROCHLORIDE 100 MCG: 10 INJECTION INTRAVENOUS at 12:25

## 2022-07-07 RX ADMIN — SODIUM CHLORIDE, POTASSIUM CHLORIDE, SODIUM LACTATE AND CALCIUM CHLORIDE: 600; 310; 30; 20 INJECTION, SOLUTION INTRAVENOUS at 12:42

## 2022-07-07 RX ADMIN — KETOROLAC TROMETHAMINE 30 MG: 30 INJECTION, SOLUTION INTRAMUSCULAR; INTRAVENOUS at 16:40

## 2022-07-07 RX ADMIN — SODIUM CHLORIDE, POTASSIUM CHLORIDE, SODIUM LACTATE AND CALCIUM CHLORIDE: 600; 310; 30; 20 INJECTION, SOLUTION INTRAVENOUS at 12:03

## 2022-07-07 RX ADMIN — PROPOFOL 200 MG: 10 INJECTION, EMULSION INTRAVENOUS at 12:10

## 2022-07-07 RX ADMIN — PROPOFOL 30 MCG/KG/MIN: 10 INJECTION, EMULSION INTRAVENOUS at 12:14

## 2022-07-07 RX ADMIN — FENTANYL CITRATE 50 MCG: 50 INJECTION, SOLUTION INTRAMUSCULAR; INTRAVENOUS at 12:10

## 2022-07-07 RX ADMIN — ONDANSETRON 4 MG: 2 INJECTION INTRAMUSCULAR; INTRAVENOUS at 12:23

## 2022-07-07 RX ADMIN — PHENYLEPHRINE HYDROCHLORIDE 100 MCG: 10 INJECTION INTRAVENOUS at 13:48

## 2022-07-07 RX ADMIN — ROCURONIUM BROMIDE 10 MG: 50 INJECTION, SOLUTION INTRAVENOUS at 14:18

## 2022-07-07 RX ADMIN — PHENYLEPHRINE HYDROCHLORIDE 100 MCG: 10 INJECTION INTRAVENOUS at 12:43

## 2022-07-07 RX ADMIN — PHENYLEPHRINE HYDROCHLORIDE 100 MCG: 10 INJECTION INTRAVENOUS at 14:43

## 2022-07-07 RX ADMIN — LIDOCAINE HYDROCHLORIDE 100 MG: 20 INJECTION, SOLUTION INFILTRATION; PERINEURAL at 12:10

## 2022-07-07 RX ADMIN — DEXAMETHASONE SODIUM PHOSPHATE 4 MG: 4 INJECTION, SOLUTION INTRA-ARTICULAR; INTRALESIONAL; INTRAMUSCULAR; INTRAVENOUS; SOFT TISSUE at 12:23

## 2022-07-07 RX ADMIN — ONDANSETRON 4 MG: 2 INJECTION INTRAMUSCULAR; INTRAVENOUS at 15:49

## 2022-07-07 RX ADMIN — ROCURONIUM BROMIDE 70 MG: 50 INJECTION, SOLUTION INTRAVENOUS at 12:10

## 2022-07-07 RX ADMIN — FENTANYL CITRATE 50 MCG: 50 INJECTION, SOLUTION INTRAMUSCULAR; INTRAVENOUS at 12:32

## 2022-07-07 ASSESSMENT — LIFESTYLE VARIABLES: TOBACCO_USE: 0

## 2022-07-07 NOTE — ANESTHESIA CARE TRANSFER NOTE
Patient: Renetta Sears    Procedure: Procedure(s):  ROBOT ASSISTED LAPAROSCOPIC RECURRENT VENTRAL HERNIA REPAIR WITH MESH       Diagnosis: Recurrent ventral hernia [K43.2]  Diagnosis Additional Information: No value filed.    Anesthesia Type:   General     Note:    Oropharynx: oropharynx clear of all foreign objects  Level of Consciousness: awake  Oxygen Supplementation: face mask    Independent Airway: airway patency satisfactory and stable  Dentition: dentition unchanged  Vital Signs Stable: post-procedure vital signs reviewed and stable  Report to RN Given: handoff report given  Patient transferred to: PACU    Handoff Report: Identifed the Patient, Identified the Reponsible Provider, Reviewed the pertinent medical history, Discussed the surgical course, Reviewed Intra-OP anesthesia mangement and issues during anesthesia, Set expectations for post-procedure period and Allowed opportunity for questions and acknowledgement of understanding      Vitals:  Vitals Value Taken Time   /80 07/07/22 1610   Temp     Pulse 120 07/07/22 1614   Resp 23 07/07/22 1614   SpO2 100 % 07/07/22 1614   Vitals shown include unvalidated device data.    Electronically Signed By: DEVON Cunha CRNA  July 7, 2022  4:15 PM

## 2022-07-07 NOTE — OP NOTE
OPERATIVE REPORT    Surgeon(s)/Proceduralist(s) and Assistants (if any):  Surgeon(s):  Román Sommer MD Panait, Lucian, MD  Circulator: Pastora Wilkinson RN; Kaylee Liu RN  Scrub Person: Cyndee Michelle  2nd Scrub: Conrad Tavera    Pre-operative/Pre-procedural Diagnosis:  Recurrent ventral hernia [K43.2]    Procedure(s):  Procedure(s):  ROBOT ASSISTED LAPAROSCOPIC RECURRENT VENTRAL HERNIA REPAIR WITH MESH    Procedure(s) findings:   2 cm supraumbilical ventral fascial defect with chronically-incarcerated preperitoneal fat. Robot-assisted repair performed with VICKEY (transabdominal preperitoneal) approach using a 12 cm x 10 cm polypropylene macroporous Parietene mesh.    Implant(s):  Parietene Macroporous Polypropylene mesh    (EBL) Estimated blood loss (ml): 5 cc    Postoperative/Postprocedure Diagnosis:   Recurrent ventral hernia [K43.2]    Indications for the Procedure    The patient is a 46 year old female with periumbilical pain and recurrent supraumbilical ventral hernia; she previously underwent a primary hernia repair with Nurolon sutures but recently noticed a new supraumbilical bulge. The patient was evaluated by me as an outpatient where we discussed the risks and benefits of robotic recurrent ventral hernia repair.  We opted for a minimally invasive approach with mesh in order to decrease chances of recurrence given this hernia has recurred once after primary defect closure.The patient wishes to proceed in hopes of definitive treatment of her condition.     Description of the Procedure:     After patient identification was performed and preoperative antibiotics were given, the patient was taken to the operating room and placed supine on the operating room table.  General anesthesia was induced without complication; SCDs were in place and functional at the time of anesthesia induction. The abdomen was prepped and draped in standard surgical fashion.  We chose our initial access port in the left  upper quadrant at Calhoun's point.  Here, the skin was anesthetized with 0.5% Marcaine and a 5 mm incision was performed.  A Veress needle was used to enter the abdominal cavity.  CO2 to intra-abdominal pressure of 15 mm Hg was induced.  The Veress needle was removed and the abdominal cavity was entered under direct vision with a 5 mm optical trocar.  We inspected the underlying viscera for any sign of Veress needle or trocar injury. None were identified.    A transverse abdominis plane block was performed with a total volume of 42 mL of 0.5% Marcaine.  This volume was divided in 6 equal parts which were injected along each anterior axillary line, 3 different spots along either anterior axillary line.    We continued with insertion of the reminder of the ports.  An 8 mm port was inserted at the level of the umbilicus, on the left anterior axillary line.  A second 8 mm port was placed in the left lower quadrant, approximately 2 fingerbreadths medial and superior to the anterior superior iliac spine.  These ports were placed under direct laparoscopic visualization.  The 5 mm port in the left upper quadrant was exchanged to an 8 mm port. The da Macarena robot was docked.    We turned our attention to the supraumbilical ventral defect. We decided to repair the hernia in preperitoneal fashion (VICKEY approach).  The peritoneum was incised longitudinally starting at the level of the falciform ligament, approximately 5 cm to the left of the midline.  The peritoneal incision was continued parallel with the midline inferiorly to approximately 7 cm below the umbilicus.  A peritoneal flap was raised starting at the level of the falciform ligament and progressing laterally and inferiorly. Dissection continued carefully in order to minimize creation of holes in the peritoneal flap; a few small peritoneal rents were created during the dissection. Once the ventral defect was reached, the hernia sac was reduced in its entirety, together  with incarcerated preperitoneal fat. We continued with our flap inferiorly for another 7 cm inferior to the umbilicus and laterally for approximately 9-10 cm to the right of the midline, past the edge of the rectus abdominis muscle.    The hernia defect measured 2 cm in transverse dimension. The hernia defect was closed with a running 0-V-Loc suture starting approximately 1 cm above the defect and continuing 1 cm below the defect. The dermis of the umbilical skin was incorporated into the suture line in order to re-create the appearance of an inward umbilicus.    A 12 x 10 cm piece of uncoated macroporous monofilamentous polypropylene mesh ([employer] Parietene mesh) was introduced in the peritoneal cavity and centered over the closed defect, providing an overlap of at least 5 cm in all directions. Interrupted 2-0 Vicryl sutures were placed at cardinal points along the periphery of the mesh in order to secure it to the posterior rectus sheath.    The peritoneal flap was subsequently sutured to the posterior rectus sheath with a running 2-0 absorbable barbed Stratafix suture in order to provide complete coverage of the mesh by the peritoneum. Small holes in the peritoneal flap was closed with another 2-0 absorbable barbed Stratafix suture.    All needles were subsequently removed from the abdominal cavity. The da Macarena robot was undocked.  CO2 was desufflated and the laparoscopic ports removed. The skin of all incisions was closed with subcuticular Monocryl sutures followed by Exofin skin glue. The patient tolerated the procedure well. She was awakened from anesthesia and moved to the postanesthesia care unit in stable condition. All instrument, needle and sponge counts were correct at the end of the case. I was present in the operating room throughout the entire duration of the procedure. There were no immediate complications.    As there was no qualified resident available to first-assist, Dr. Elroy Loo served  as the first-assistant. He assisted with peritoneal flap dissection, instrument and needle exchanges, mesh introduction and skin closure.    Román Sommer MD  Minimally Invasive & Bariatric Surgery   [facility]

## 2022-07-07 NOTE — ANESTHESIA PROCEDURE NOTES
Airway       Patient location during procedure: OR       Procedure Start/Stop Times: 7/7/2022 12:13 PM  Staff -        Anesthesiologist:  Prakash Ewing MD       CRNA: Ayde Ruiz APRN CRNA       Performed By: CRNAIndications and Patient Condition       Indications for airway management: braydon-procedural       Induction type:intravenous       Mask difficulty assessment: 1 - vent by mask    Final Airway Details       Final airway type: endotracheal airway       Successful airway: ETT - single  Endotracheal Airway Details        ETT size (mm): 7.0       Cuffed: yes       Successful intubation technique: video laryngoscopy       VL Blade Size: Glidescope 3       Grade View of Cords: 1       Adjucts: stylet       Position: Right       Measured from: gums/teeth       Secured at (cm): 21       Bite block used: None    Post intubation assessment        Placement verified by: capnometry, equal breath sounds and chest rise        Number of attempts at approach: 1       Secured with: pink tape       Ease of procedure: easy       Dentition: Intact and Unchanged    Medication(s) Administered   Medication Administration Time: 7/7/2022 12:13 PM

## 2022-07-07 NOTE — DISCHARGE INSTRUCTIONS
Same Day Surgery Discharge Instructions for  Sedation and General Anesthesia     It's not unusual to feel dizzy, light-headed or faint for up to 24 hours after surgery or while taking pain medication.  If you have these symptoms: sit for a few minutes before standing and have someone assist you when you get up to walk or use the bathroom.    You should rest and relax for the next 24 hours. We recommend you make arrangements to have an adult stay with you for at least 24 hours after your discharge.  Avoid hazardous and strenuous activity.    DO NOT DRIVE any vehicle or operate mechanical equipment for 24 hours following the end of your surgery.  Even though you may feel normal, your reactions may be affected by the medication you have received.    Do not drink alcoholic beverages for 24 hours following surgery.     Slowly progress to your regular diet as you feel able. It's not unusual to feel nauseated and/or vomit after receiving anesthesia.  If you develop these symptoms, drink clear liquids (apple juice, ginger ale, broth, 7-up, etc. ) until you feel better.  If your nausea and vomiting persists for 24 hours, please notify your surgeon.      All narcotic pain medications, along with inactivity and anesthesia, can cause constipation. Drinking plenty of liquids and increasing fiber intake will help.    For any questions of a medical nature, call your surgeon.    Do not make important decisions for 24 hours.    If you had general anesthesia, you may have a sore throat for a couple of days related to the breathing tube used during surgery.  You may use Cepacol lozenges to help with this discomfort.  If it worsens or if you develop a fever, contact your surgeon.     If you feel your pain is not well managed with the pain medications prescribed by your surgeon, please contact your surgeon's office to let them know so they can address your concerns.       **If you have questions or concerns about your procedure,   call  Dr. BACH [phone]**

## 2022-07-07 NOTE — ANESTHESIA PREPROCEDURE EVALUATION
"Anesthesia Pre-Procedure Evaluation    Patient: Renetta Sears   MRN: [id number] : 1975        Procedure : Procedure(s):  ROBOT ASSISTED LAPAROSCOPIC VENTRAL HERNIA REPAIR WITH HERNIA          Past Medical History:   Diagnosis Date     Acute cholecystitis     No s/p harvey     Anxiety      Chronic headaches      Depression      Esophagitis medicamentosa     doxycycline     Herpes simplex virus infection      Hidradenitis      Mixed hyperlipidemia      Obese      Other pulmonary embolism and infarction 2000      Past Surgical History:   Procedure Laterality Date     CERVIX SURGERY  1996    \"repair\"     CHOLECYSTECTOMY       HC REMOVE TONSILS/ADENOIDS,14+ Y/O  2004    T & A 14+y.o.     HERNIA REPAIR       HYSTERECTOMY Bilateral 2019    Procedure: TOTAL LAPAROSCOPIC HYSTERECTOMY BILATERAL SALPINGECTOMY CYSTOSCOPY;  Surgeon: Sirena Dunlap MD;  Location: [facility];  Service: Gynecology     LAPAROSCOPIC CHOLECYSTECTOMY      Cholecystectomy, Laparoscopic     SURGICAL HISTORY OF -   2001    Tubal ligation     TONSILLECTOMY       TUBAL LIGATION        Allergies   Allergen Reactions     Estrogens Other (See Comments)     Causes blood clots  PN: clotting  Blood clots       Estrogens      Medroxyprogesterone      Blood clots on Depo Provera      Social History     Tobacco Use     Smoking status: Never Smoker     Smokeless tobacco: Never Used   Substance Use Topics     Alcohol use: Yes     Comment: occasionally      Wt Readings from Last 1 Encounters:   22 70.3 kg (155 lb)        Anesthesia Evaluation   Pt has had prior anesthetic.     No history of anesthetic complications       ROS/MED HX  ENT/Pulmonary:    (-) tobacco use   Neurologic:     (+) peripheral neuropathy, - Cervical radiculopathy.  : Chronic headaches.   Cardiovascular:     (+) Dyslipidemia -----fainting (syncope). Previous cardiac testing   Echo: Date: Results:    Stress Test: Date: Results:    ECG " Reviewed: Date: 2/25/22 Results:  NSR  Cath: Date: Results:      METS/Exercise Tolerance:     Hematologic:     (+) History of blood clots (PE),     Musculoskeletal:       GI/Hepatic:     (+) esophageal disease,     Renal/Genitourinary:       Endo:       Psychiatric/Substance Use:     (+) psychiatric history depression and anxiety     Infectious Disease: Comment: Hidradenitis      Malignancy:       Other:            Physical Exam    Airway        Mallampati: II   TM distance: > 3 FB   Neck ROM: full   Mouth opening: > 3 cm    Respiratory Devices and Support         Dental  no notable dental history         Cardiovascular          Rhythm and rate: regular and normal     Pulmonary           breath sounds clear to auscultation           OUTSIDE LABS:  CBC:   Lab Results   Component Value Date    WBC 6.7 02/25/2022    WBC 9.9 05/08/2021    HGB 13.8 02/25/2022    HGB 13.7 05/08/2021    HCT 40.8 02/25/2022    HCT 40.5 05/08/2021     02/25/2022     05/08/2021     BMP:   Lab Results   Component Value Date     02/25/2022     05/08/2021    POTASSIUM 3.8 02/25/2022    POTASSIUM 3.8 05/08/2021    CHLORIDE 105 02/25/2022    CHLORIDE 108 05/08/2021    CO2 22 02/25/2022    CO2 28 05/08/2021    BUN 7 (L) 02/25/2022    BUN 9 05/08/2021    CR 0.70 02/25/2022    CR 0.79 05/08/2021    GLC 97 02/25/2022     (H) 05/08/2021     COAGS:   Lab Results   Component Value Date    PTT 28 09/11/2012    INR 0.98 09/11/2012     POC:   Lab Results   Component Value Date    HCG Negative 02/25/2022    HCGS Negative 09/11/2012     HEPATIC:   Lab Results   Component Value Date    ALBUMIN 4.1 02/25/2022    PROTTOTAL 7.0 02/25/2022    ALT 20 02/25/2022    AST 15 02/25/2022    ALKPHOS 49 02/25/2022    BILITOTAL 1.0 02/25/2022     OTHER:   Lab Results   Component Value Date    LACT 1.1 05/08/2021    JAYLON 9.1 02/25/2022    LIPASE 11 02/25/2022    TSH 1.02 09/15/2016    T4 0.88 06/16/2010    T3 97 06/16/2010    SED 21 (H)  08/05/2005       Anesthesia Plan    ASA Status:  2   NPO Status:  NPO Appropriate    Anesthesia Type: General.     - Airway: ETT   Induction: Intravenous.      Techniques and Equipment:     - Airway: Video-Laryngoscope         Consents    Anesthesia Plan(s) and associated risks, benefits, and realistic alternatives discussed. Questions answered and patient/representative(s) expressed understanding.    - Discussed:     - Discussed with:  Patient         Postoperative Care    Pain management: IV analgesics, Oral pain medications, Multi-modal analgesia.   PONV prophylaxis: Ondansetron (or other 5HT-3), Dexamethasone or Solumedrol, Background Propofol Infusion     Comments:    Other Comments: Precedex pushes prn            Prakash Ewing MD

## 2022-07-08 NOTE — ANESTHESIA POSTPROCEDURE EVALUATION
Patient: Renetta Sears    Procedure: Procedure(s):  ROBOT ASSISTED LAPAROSCOPIC RECURRENT VENTRAL HERNIA REPAIR WITH MESH       Anesthesia Type:  General    Note:  Disposition: Outpatient   Postop Pain Control: Uneventful            Sign Out: Well controlled pain   PONV: No   Neuro/Psych: Uneventful            Sign Out: Acceptable/Baseline neuro status   Airway/Respiratory: Uneventful            Sign Out: Acceptable/Baseline resp. status   CV/Hemodynamics: Uneventful            Sign Out: Acceptable CV status; No obvious hypovolemia; No obvious fluid overload   Other NRE: NONE   DID A NON-ROUTINE EVENT OCCUR? No           Last vitals:  Vitals Value Taken Time   /79 07/07/22 1800   Temp 37.1  C (98.7  F) 07/07/22 1800   Pulse 103 07/07/22 1804   Resp 17 07/07/22 1804   SpO2 99 % 07/07/22 1802   Vitals shown include unvalidated device data.    Electronically Signed By: Jerry Villarreal DO  July 7, 2022  9:53 PM

## 2022-07-08 NOTE — OR NURSING
VSS. A&O. Lap sites CDI. Pain tolerable. No n/v. Void x1. DC instructions, meds and follow up reviewed with patient and friend. Discharged to home with friend.

## 2022-08-01 ENCOUNTER — HOSPITAL ENCOUNTER (OUTPATIENT)
Dept: CT IMAGING | Facility: CLINIC | Age: 47
Discharge: HOME | End: 2022-08-01
Attending: SURGERY | Admitting: SURGERY
Payer: COMMERCIAL

## 2022-08-01 DIAGNOSIS — R10.9 POSTOPERATIVE ABDOMINAL PAIN: ICD-10-CM

## 2022-08-01 DIAGNOSIS — G89.18 POSTOPERATIVE ABDOMINAL PAIN: ICD-10-CM

## 2022-08-01 PROCEDURE — 74176 CT ABD & PELVIS W/O CONTRAST: CPT

## 2022-08-21 ENCOUNTER — HEALTH MAINTENANCE LETTER (OUTPATIENT)
Age: 47
End: 2022-08-21

## 2022-08-25 ENCOUNTER — HOSPITAL ENCOUNTER (EMERGENCY)
Facility: CLINIC | Age: 47
Discharge: HOME | End: 2022-08-26
Attending: EMERGENCY MEDICINE | Admitting: EMERGENCY MEDICINE
Payer: COMMERCIAL

## 2022-08-25 DIAGNOSIS — A59.9 TRICHIMONIASIS: ICD-10-CM

## 2022-08-25 LAB
ALBUMIN UR-MCNC: NEGATIVE MG/DL
APPEARANCE UR: CLEAR
BILIRUB UR QL STRIP: NEGATIVE
COLOR UR AUTO: ABNORMAL
GLUCOSE UR STRIP-MCNC: NEGATIVE MG/DL
HCG UR QL: NEGATIVE
HGB UR QL STRIP: NEGATIVE
KETONES UR STRIP-MCNC: NEGATIVE MG/DL
LEUKOCYTE ESTERASE UR QL STRIP: ABNORMAL
MUCOUS THREADS #/AREA URNS LPF: PRESENT /LPF
NITRATE UR QL: NEGATIVE
PH UR STRIP: 6.5 [PH] (ref 5–7)
RBC URINE: 1 /HPF
SP GR UR STRIP: 1.02 (ref 1–1.03)
SQUAMOUS EPITHELIAL: <1 /HPF
UROBILINOGEN UR STRIP-MCNC: 2 MG/DL
WBC URINE: 2 /HPF

## 2022-08-25 PROCEDURE — 87210 SMEAR WET MOUNT SALINE/INK: CPT | Performed by: EMERGENCY MEDICINE

## 2022-08-25 PROCEDURE — 81001 URINALYSIS AUTO W/SCOPE: CPT | Performed by: EMERGENCY MEDICINE

## 2022-08-25 PROCEDURE — 87591 N.GONORRHOEAE DNA AMP PROB: CPT | Performed by: EMERGENCY MEDICINE

## 2022-08-25 PROCEDURE — 99283 EMERGENCY DEPT VISIT LOW MDM: CPT

## 2022-08-25 PROCEDURE — 87491 CHLMYD TRACH DNA AMP PROBE: CPT | Performed by: EMERGENCY MEDICINE

## 2022-08-25 PROCEDURE — 81025 URINE PREGNANCY TEST: CPT | Performed by: EMERGENCY MEDICINE

## 2022-08-25 ASSESSMENT — ACTIVITIES OF DAILY LIVING (ADL): ADLS_ACUITY_SCORE: 33

## 2022-08-26 VITALS
OXYGEN SATURATION: 97 % | TEMPERATURE: 98.7 F | DIASTOLIC BLOOD PRESSURE: 68 MMHG | RESPIRATION RATE: 18 BRPM | SYSTOLIC BLOOD PRESSURE: 112 MMHG | HEART RATE: 78 BPM

## 2022-08-26 LAB
C TRACH DNA SPEC QL NAA+PROBE: NEGATIVE
C TRACH DNA SPEC QL NAA+PROBE: NEGATIVE
CLUE CELLS: ABNORMAL
CLUE CELLS: ABNORMAL
N GONORRHOEA DNA SPEC QL NAA+PROBE: NEGATIVE
N GONORRHOEA DNA SPEC QL NAA+PROBE: NEGATIVE
TRICHOMONAS, WET PREP: PRESENT
TRICHOMONAS, WET PREP: PRESENT
WBC'S/HIGH POWER FIELD, WET PREP: ABNORMAL
WBC'S/HIGH POWER FIELD, WET PREP: ABNORMAL
YEAST, WET PREP: ABNORMAL
YEAST, WET PREP: ABNORMAL

## 2022-08-26 RX ORDER — METRONIDAZOLE 500 MG/1
500 TABLET ORAL 2 TIMES DAILY
Qty: 14 TABLET | Refills: 0 | Status: SHIPPED | OUTPATIENT
Start: 2022-08-26 | End: 2022-09-02

## 2022-08-26 NOTE — DISCHARGE INSTRUCTIONS
Tell your partners that you have tested positive for trichomonas as this is a sexually transmitted infection and they should get treated.

## 2022-08-26 NOTE — ED PROVIDER NOTES
[facility] [address]    [address] [facility] [address]    Phone:  [phone]       Thank You for choosing us for your health care visit. We are glad to serve you and happy to provide you with this summary of your visit. Please help us to ensure we have accurate records. If you find anything that needs to be changed, please let our staff know as soon as possible.          Your Demographic Information     Patient Name Sex     Aditya Fowler Male 1930       Ethnic Group Patient Race    Not of  or  Origin White      Your Visit Details     Date & Time Provider Department    3/9/2017 1:45 PM Griffin Steven MD [facility] [address]      Your Upcoming Appointment*(Max 10)     Friday March 10, 2017  9:30 AM CST   PT - HOME VISIT with Bishop Cobb PTA   [address])    [address] [facility] [id number] [phone]            Friday March 10, 2017  1:00 PM CST   OT - INITIAL EVALUATION with Salbador Das OT   [address])    [address] [facility] [id number] [phone]            2017  3:00 PM CDT   Transitional Care Management with Vasiliy Perkins MD   [facility])    [id number] [facility] [address]   [phone]            2017  1:00 PM CDT   SN - HOME VISIT with Rubi Weinstein RN   [address])    [address] [facility] [id number] [phone]            Wednesday March 15, 2017 11:40 AM CDT   Follow-up Visit with Yifan Mclaughlin MD   [facility] ([facility])    [address] [facility] [id number] [phone]            2017 11:15 AM CDT   ICD Check Office with Amanda Ellison NP, [facility] [address] DEVICE CHECK   [facility]    History   Chief Complaint:  Vaginal Discharge       HPI   Renetta Sears is a 47 year old female with history of trichomonas and a  presenting to the emergency department for vaginal discharge for the past couple of days.  There is no dysuria or vaginal pain.  It is not malodorous.  She did have unprotected sex with a partner who previously had given her HSV.  She has had a hysterectomy.  Has not otherwise been ill with any fevers, cough, sore throat.  She is currently on Valtrex for chronic suppression..    Review of Systems  Please see the HPI the remaining systems are negative    Allergies:    Estrogens  Estrogens  Medroxyprogesterone    Medications:    metroNIDAZOLE (FLAGYL) 500 MG tablet  acetaminophen (TYLENOL) 325 MG tablet  amitriptyline (ELAVIL) 10 MG tablet  Ascorbic Acid (VITAMIN C) 500 MG CAPS  cetirizine (ZYRTEC) 10 MG tablet  Cholecalciferol (VITAMIN D3 PO)  cyclobenzaprine (FLEXERIL) 10 MG tablet  doxycycline monohydrate (MONODOX) 100 MG capsule  ferrous sulfate (FEROSUL) 325 (65 Fe) MG tablet  fish oil-omega-3 fatty acids 1000 MG capsule  fluconazole (DIFLUCAN) 150 MG tablet  hydrOXYzine (ATARAX) 25 MG tablet  ibuprofen (ADVIL/MOTRIN) 600 MG tablet  MAGNESIUM OXIDE PO  methylPREDNISolone (MEDROL DOSEPAK) 4 MG tablet therapy pack  montelukast (SINGULAIR) 10 MG tablet  Multiple Vitamins-Minerals (MULTIVITAL PO)  spironolactone (ALDACTONE) 50 MG tablet  valACYclovir (VALTREX) 1000 mg tablet        Past Medical History:       Past Medical History:   Diagnosis Date     Acute cholecystitis      Anxiety      Chronic headaches      Depression      Esophagitis medicamentosa      Herpes simplex virus infection      Hidradenitis      Mixed hyperlipidemia      Obese      Other pulmonary embolism and infarction 2000     Patient Active Problem List   Diagnosis     Moderate major depression (H)     Chronic headache     Chronic urethritis     CARDIOVASCULAR SCREENING; LDL GOAL LESS THAN 160      "Anxiety     Obesity     Syncope         Past Surgical History:      Past Surgical History:   Procedure Laterality Date     CERVIX SURGERY  01/01/1996    \"repair\"     CHOLECYSTECTOMY       DAVINCI XI HERNIORRHAPHY VENTRAL N/A 7/7/2022    Procedure: ROBOT ASSISTED LAPAROSCOPIC RECURRENT VENTRAL HERNIA REPAIR WITH MESH;  Surgeon: Román Sommer MD;  Location:  OR      REMOVE TONSILS/ADENOIDS,12+ Y/O  01/01/2004    T & A 14+y.o.     HERNIA REPAIR       HYSTERECTOMY Bilateral 11/12/2019    Procedure: TOTAL LAPAROSCOPIC HYSTERECTOMY BILATERAL SALPINGECTOMY CYSTOSCOPY;  Surgeon: Sirena Dunlap MD;  Location: [facility];  Service: Gynecology     LAPAROSCOPIC CHOLECYSTECTOMY      Cholecystectomy, Laparoscopic     SURGICAL HISTORY OF -   07/01/2001    Tubal ligation     TONSILLECTOMY       TUBAL LIGATION          Family History:      Family History   Problem Relation Age of Onset     Diabetes Maternal Grandmother      Hypertension Maternal Grandmother      Arthritis Maternal Grandmother      Thyroid Disease Maternal Grandmother      Cardiovascular Maternal Grandmother      Hypertension Maternal Grandfather      Diabetes Paternal Grandmother      Depression Paternal Grandmother      Breast Cancer Other         paternal aunt     Arthritis Mother      Thyroid Disease Mother      Cancer - colorectal Paternal Uncle         diagnosied early 50s         Social History:  The patient presents to the ED with self  PCP: [unfilled]     Physical Exam     Patient Vitals for the past 24 hrs:   BP Temp Temp src Pulse Resp SpO2   08/25/22 2312 112/68 -- -- 78 -- --   08/25/22 2207 130/54 98.7  F (37.1  C) Temporal 85 20 97 %       Physical Exam      Physical Exam   Constitutional:  Patient is oriented to person, place, and time. They appear well-developed and well-nourished. Mild distress secondary to Vaginal discharge  HENT:   Mouth/Throat:   Wearing facemask  Eyes:    Conjunctivae normal and EOM are normal. Pupils " [facility] [address] ([facility])    [address]  [address]  [facility] [address]   [phone]            Thursday April 06, 2017  1:45 PM CDT   [facility]-Op Visit [facility] [address] ([facility] [address])    [address] [facility] [address]   [phone]            Thursday June 22, 2017  2:15 PM CDT   Follow-up Visit with Griffin Steven MD   [facility] [address] ([facility] [address])    [address] [facility] [address]   [phone]              Your Upcoming Home Remote Device Check     Wednesday June 28, 2017  9:00 AM CDT   ICD [facility]/Remote [facility]   [facility] [address] ([facility])    [address]  [address]  [facility] [address]   [phone]              Conditions Discussed Today or Order-Related Diagnoses        Comments    S/P Left Total Knee Replacement     -  Primary       Your Vitals Were     BP Pulse Temp Smoking Status          126/72 (BP Location: [facility], Patient Position: Sitting, Cuff Size: Regular) 74 96.9 °F (36.1 °C) (Oral) Former Smoker        Medications Prescribed or Re-Ordered Today     traMADOL (ULTRAM) 50 MG tablet    Sig - Route: Take 1 tablet by mouth every 6 hours as needed for Pain (take 2 tablets before physical therapy). - Oral    Class: Normal    [facility] [id number] [place] [address] - [id number]  AT [facility] Ph #: [phone]      Your Current Medications Are        Disp Refills Start End    traMADOL (ULTRAM) 50 MG tablet 40 tablet 0 3/9/2017     Sig - Route: Take 1 tablet by mouth every 6 hours as needed for Pain (take 2 tablets before physical therapy). - Oral    acetaminophen (TYLENOL) 500 MG tablet 30 tablet 0 2/27/2017     Sig - Route: Take 2 tablets by mouth every 8 hours. - Oral    Class: OTC    docusate  are equal, round, and reactive to light.   Neck:    Normal range of motion.   Cardiovascular: Normal rate, regular rhythm and normal heart sounds.  Exam reveals no gallop and no friction rub.  No murmur heard.  Pulmonary/Chest:  Effort normal and breath sounds normal. Patient has no wheezes. Patient has no rales.   Abdominal:   Pelvic exam does show a moderate amount of white discharge.  No significant tenderness.  Musculoskeletal:  Normal range of motion. Patient exhibits no edema.   Neurological:   Patient is alert and oriented to person, place, and time. Patient has normal strength. No cranial nerve deficit or sensory deficit. GCS 15  Skin:   Skin is warm and dry. No rash noted. No erythema.   Psychiatric:   Patient has a normal mood and affect. Patient's behavior is normal. Judgment and thought content normal.       Emergency Department Course   ECG  ECG results from 02/25/22   ECG 12-LEAD WITH MUSE ([facility])     Value    Systolic Blood Pressure     Diastolic Blood Pressure     Ventricular Rate 75    Atrial Rate 75    KS Interval 164    QRS Duration 76        QTc 422    P Axis 71    R AXIS 53    T Axis 60    Interpretation ECG      Sinus rhythm with sinus arrhythmia  Normal ECG  When compared with ECG of 08-MAY-2021 14:03,  No significant change was found  Confirmed by SEE ED PROVIDER NOTE FOR, ECG INTERPRETATION ([id number]),  JENNI ROA ([id number]) on 2/26/2022 10:03:35 AM         I  Laboratory:  Labs Ordered and Resulted from Time of ED Arrival to Time of ED Departure   UA MACROSCOPIC WITH REFLEX TO MICRO AND CULTURE - Abnormal       Result Value    Color Urine Light Yellow      Appearance Urine Clear      Glucose Urine Negative      Bilirubin Urine Negative      Ketones Urine Negative      Specific Gravity Urine 1.025      Blood Urine Negative      pH Urine 6.5      Protein Albumin Urine Negative      Urobilinogen Urine 2.0      Nitrite Urine Negative      Leukocyte Esterase Urine Trace (*)     Mucus Urine  sodium-sennosides (SENOKOT S) 50-8.6 MG per tablet 60 tablet 1 2/27/2017     Sig - Route: Take 2 tablets by mouth daily. - Oral    Class: Eprescribe    warfarin (COUMADIN) 5 MG tablet 97 tablet 3 2/27/2017     Sig: Take 7.5 mg tonight (2/27), take 5 mg tomorrow (2/28). Get INR checked on Wednesday, 3/1.    Class: Add to AVS    Omega-3 Fatty Acids (FISH OIL) 1000 MG capsule        Sig - Route: Take 1 g by mouth 3 times daily (with meals). - Oral    Class: Historical Med    Co-Enzyme Q-10 100 MG Cap        Sig - Route: Take 1 capsule by mouth daily. - Oral    Class: Historical Med    Boswellia-Glucosamine-Vit D (GLUCOSAMINE COMPLEX) Tab        Sig - Route: Take 1 tablet by mouth daily. - Oral    Class: Historical Med    Lutein 20 MG Cap        Sig - Route: Take 1 capsule by mouth daily. - Oral    Class: Historical Med    sotalol 80 MG tablet 180 tablet 3 1/31/2017     Sig: Take 1 tablet by mouth two  times daily    Class: Eprescribe    NIASPAN 1000 MG CR tablet 90 tablet 3 1/31/2017     Sig: Take 1 tablet by mouth  nightly    Class: Eprescribe    simvastatin (ZOCOR) 20 MG tablet 90 tablet 3 1/31/2017     Sig: Take 1 tablet by mouth  nightly    Class: Eprescribe      Discontinued Medications        Reason for Discontinue    polyethylene glycol (GLYCOLAX, MIRALAX) packet Therapy Completed      Allergies     No Known Allergies      Immunizations History as of 3/9/2017     Name Date    Influenza 10/5/2016, 10/1/2015, 10/31/2014, 10/1/2013    Pneumococcal Conjugate 13 Valent 11/24/2014    Pneumococcal Polysaccharide Adult 11/25/2015    Tdap 5/15/2012      Problem List as of 3/9/2017     PAF (paroxysmal atrial fibrillation)    Tachy-darci syndrome    Skin cancer    Macular degeneration, bilateral    Hyperlipidemia    Prostate CA    DJD (degenerative joint disease)    Essential hypertension    Valvular heart disease    Chronic kidney disease    Status post CVA    [facility] BIV ICD    Current use of long term anticoagulation     Present (*)     RBC Urine 1      WBC Urine 2      Squamous Epithelials Urine <1     WET PREPARATION - Abnormal    Trichomonas Present (*)     Yeast Absent      Clue Cells Absent      WBCs/high power field 2+ (*)    WET PREPARATION - Abnormal    Trichomonas Present (*)     Yeast Absent      Clue Cells Absent      WBCs/high power field 2+ (*)    HCG QUALITATIVE URINE - Normal    hCG Urine Qualitative Negative     CHLAMYDIA TRACHOMATIS PCR   NEISSERIA GONORRHOEAE PCR   NEISSERIA GONORRHOEAE PCR   CHLAMYDIA TRACHOMATIS PCR        Emergency Department Course:             Reviewed:  I reviewed nursing notes, vitals and past medical history    Assessments/Consults:       Interventions:  Medications - No data to display    Disposition:  The patient was discharged to home.     Impression & Plan     CMS Diagnoses: None      Medical Decision Making:    Renetta Sears is a 47 year old female who presents to the emergency department with vaginal discharge.  No real discomfort with urination or labial pain or sores.  We initially gave her self swabs for both the wet prep and the GC chlamydia.  However when I did the exam I redid the swabs as there clearly was an abnormality that was not seen on the first swab.  When the swabs that I performed were sent down I got a call that it was positive for trichomonas.  They then looked on the first swab and they did see trichomonas on that swab so they corrected the read.  At this time I will treat her with Flagyl.  We will wait for the GC and chlamydia test to return at this time given that this is less likely to be the cause.  She is made aware that this is a sexually transmitted infection and she should make her partner aware so that they can get fully treated as well.        Diagnosis:    ICD-10-CM    1. Trichimoniasis  A59.9        Discharge Medications:  New Prescriptions    METRONIDAZOLE (FLAGYL) 500 MG TABLET    Take 1 tablet (500 mg) by mouth 2 times daily for 7 days  S/P Left Total Knee Replacement     Post-op pain              Patient Instructions    Your current Orthopaedic problem we are working together to treat is:  LeftTotal Knee Replacement.      Continue using Tylenol and Tramadol for pain.     Take 2 tablets of Tramadol before physical therapy.    Do not exceed 4000 mg of Tylenol daily.     It is recommended you schedule a follow-up appointment with Griffin Steven MD  6 weeks after surgery.      Office hours are 8:00 am to 5:00 pm Monday through Friday. If it is urgent that you speak with someone outside of these hours, our [facility] will be able to assist you. You can reach the office by calling the [place] central scheduling line at [phone]      Our patients are important!  We want to improve, and you can help!  You may receive a survey asking you about your visit.  Please complete the survey.  We will use your feedback to make improvements.      Thank you for choosing [facility] as your Orthopaedic provider!                                     Tiny Priest MD  08/26/22 0015

## 2022-08-26 NOTE — ED TRIAGE NOTES
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 Pt reports taking daily antivirals.      Triage Assessment     Row Name 08/25/22 4511       Respiratory WDL    Rhythm/Pattern, Respiratory depth regular;pattern regular;unlabored       Leonard Coma Scale    Best Eye Response 4-->(E4) spontaneous    Best Motor Response 6-->(M6) obeys commands    Best Verbal Response 5-->(V5) oriented    Leonard Coma Scale Score 15

## 2022-08-27 ENCOUNTER — TELEPHONE (OUTPATIENT)
Dept: EMERGENCY MEDICINE | Facility: CLINIC | Age: 47
End: 2022-08-27

## 2022-08-27 NOTE — TELEPHONE ENCOUNTER
[facility] Emergency Department Lab result notification     Patient/parent Name  Renetta    Reason for call  Patient requesting lab result    Lab Result  Component      Latest Ref Rng & Units 8/25/2022   N Gonorrhea PCR      Negative Negative   Chlamydia Trachomatis PCR      Negative Negative     Component      Latest Ref Rng & Units 8/25/2022   Chlamydia Trachomatis PCR      Negative Negative   N Gonorrhea PCR      Negative Negative       Contact your PCP clinic or return to the Emergency department if your:    Symptoms return.    Symptoms do not improved after 3 days on antibiotic.    Symptoms do not resolve after completing antibiotic.    Symptoms worsen or other concerning symptom's.      Mariposa Salazar, RN  [facility] [place]  Emergency Dept Lab Result RN  Ph# [phone]     Copy of Lab result

## 2022-11-21 ENCOUNTER — HEALTH MAINTENANCE LETTER (OUTPATIENT)
Age: 47
End: 2022-11-21

## 2022-11-22 ENCOUNTER — APPOINTMENT (OUTPATIENT)
Dept: CT IMAGING | Facility: CLINIC | Age: 47
End: 2022-11-22
Attending: EMERGENCY MEDICINE
Payer: COMMERCIAL

## 2022-11-22 ENCOUNTER — APPOINTMENT (OUTPATIENT)
Dept: ULTRASOUND IMAGING | Facility: CLINIC | Age: 47
End: 2022-11-22
Attending: EMERGENCY MEDICINE
Payer: COMMERCIAL

## 2022-11-22 ENCOUNTER — HOSPITAL ENCOUNTER (EMERGENCY)
Facility: CLINIC | Age: 47
Discharge: HOME | End: 2022-11-22
Attending: EMERGENCY MEDICINE | Admitting: EMERGENCY MEDICINE
Payer: COMMERCIAL

## 2022-11-22 VITALS
OXYGEN SATURATION: 98 % | RESPIRATION RATE: 20 BRPM | BODY MASS INDEX: 26.03 KG/M2 | DIASTOLIC BLOOD PRESSURE: 73 MMHG | SYSTOLIC BLOOD PRESSURE: 110 MMHG | HEART RATE: 64 BPM | HEIGHT: 66 IN | TEMPERATURE: 98.2 F | WEIGHT: 162 LBS

## 2022-11-22 DIAGNOSIS — R10.31 ABDOMINAL PAIN, RIGHT LOWER QUADRANT: ICD-10-CM

## 2022-11-22 LAB
ALBUMIN SERPL-MCNC: 3.7 G/DL (ref 3.4–5)
ALBUMIN UR-MCNC: 20 MG/DL
ALP SERPL-CCNC: 46 U/L (ref 40–150)
ALT SERPL W P-5'-P-CCNC: 44 U/L (ref 0–50)
ANION GAP SERPL CALCULATED.3IONS-SCNC: 4 MMOL/L (ref 3–14)
APPEARANCE UR: CLEAR
AST SERPL W P-5'-P-CCNC: 25 U/L (ref 0–45)
BASOPHILS # BLD AUTO: 0 10E3/UL (ref 0–0.2)
BASOPHILS NFR BLD AUTO: 1 %
BILIRUB SERPL-MCNC: 1 MG/DL (ref 0.2–1.3)
BILIRUB UR QL STRIP: NEGATIVE
BUN SERPL-MCNC: 10 MG/DL (ref 7–30)
CALCIUM SERPL-MCNC: 9.3 MG/DL (ref 8.5–10.1)
CHLORIDE BLD-SCNC: 106 MMOL/L (ref 94–109)
CO2 SERPL-SCNC: 27 MMOL/L (ref 20–32)
COLOR UR AUTO: YELLOW
CREAT SERPL-MCNC: 0.72 MG/DL (ref 0.52–1.04)
EOSINOPHIL # BLD AUTO: 0.1 10E3/UL (ref 0–0.7)
EOSINOPHIL NFR BLD AUTO: 3 %
ERYTHROCYTE [DISTWIDTH] IN BLOOD BY AUTOMATED COUNT: 11.8 % (ref 10–15)
GFR SERPL CREATININE-BSD FRML MDRD: >90 ML/MIN/1.73M2
GLUCOSE BLD-MCNC: 108 MG/DL (ref 70–99)
GLUCOSE UR STRIP-MCNC: NEGATIVE MG/DL
HCT VFR BLD AUTO: 40.3 % (ref 35–47)
HGB BLD-MCNC: 14.2 G/DL (ref 11.7–15.7)
HGB UR QL STRIP: NEGATIVE
IMM GRANULOCYTES # BLD: 0 10E3/UL
IMM GRANULOCYTES NFR BLD: 0 %
KETONES UR STRIP-MCNC: NEGATIVE MG/DL
LEUKOCYTE ESTERASE UR QL STRIP: ABNORMAL
LIPASE SERPL-CCNC: 47 U/L (ref 73–393)
LYMPHOCYTES # BLD AUTO: 1.7 10E3/UL (ref 0.8–5.3)
LYMPHOCYTES NFR BLD AUTO: 41 %
MCH RBC QN AUTO: 32.2 PG (ref 26.5–33)
MCHC RBC AUTO-ENTMCNC: 35.2 G/DL (ref 31.5–36.5)
MCV RBC AUTO: 91 FL (ref 78–100)
MONOCYTES # BLD AUTO: 0.3 10E3/UL (ref 0–1.3)
MONOCYTES NFR BLD AUTO: 7 %
NEUTROPHILS # BLD AUTO: 2 10E3/UL (ref 1.6–8.3)
NEUTROPHILS NFR BLD AUTO: 48 %
NITRATE UR QL: NEGATIVE
NRBC # BLD AUTO: 0 10E3/UL
NRBC BLD AUTO-RTO: 0 /100
PH UR STRIP: 6 [PH] (ref 5–7)
PLATELET # BLD AUTO: 237 10E3/UL (ref 150–450)
POTASSIUM BLD-SCNC: 3.7 MMOL/L (ref 3.4–5.3)
PROT SERPL-MCNC: 7.1 G/DL (ref 6.8–8.8)
RBC # BLD AUTO: 4.41 10E6/UL (ref 3.8–5.2)
SODIUM SERPL-SCNC: 137 MMOL/L (ref 133–144)
SP GR UR STRIP: 1.03 (ref 1–1.03)
UROBILINOGEN UR STRIP-MCNC: NORMAL MG/DL
WBC # BLD AUTO: 4.2 10E3/UL (ref 4–11)

## 2022-11-22 PROCEDURE — 85025 COMPLETE CBC W/AUTO DIFF WBC: CPT | Performed by: EMERGENCY MEDICINE

## 2022-11-22 PROCEDURE — 83690 ASSAY OF LIPASE: CPT | Performed by: EMERGENCY MEDICINE

## 2022-11-22 PROCEDURE — 258N000003 HC RX IP 258 OP 636: Performed by: EMERGENCY MEDICINE

## 2022-11-22 PROCEDURE — 82040 ASSAY OF SERUM ALBUMIN: CPT | Performed by: EMERGENCY MEDICINE

## 2022-11-22 PROCEDURE — 36415 COLL VENOUS BLD VENIPUNCTURE: CPT | Performed by: EMERGENCY MEDICINE

## 2022-11-22 PROCEDURE — 99285 EMERGENCY DEPT VISIT HI MDM: CPT | Mod: 25

## 2022-11-22 PROCEDURE — 74177 CT ABD & PELVIS W/CONTRAST: CPT

## 2022-11-22 PROCEDURE — 250N000009 HC RX 250: Performed by: EMERGENCY MEDICINE

## 2022-11-22 PROCEDURE — 81003 URINALYSIS AUTO W/O SCOPE: CPT | Performed by: EMERGENCY MEDICINE

## 2022-11-22 PROCEDURE — 96376 TX/PRO/DX INJ SAME DRUG ADON: CPT

## 2022-11-22 PROCEDURE — 80053 COMPREHEN METABOLIC PANEL: CPT | Performed by: EMERGENCY MEDICINE

## 2022-11-22 PROCEDURE — 96374 THER/PROPH/DIAG INJ IV PUSH: CPT | Mod: 59

## 2022-11-22 PROCEDURE — 96361 HYDRATE IV INFUSION ADD-ON: CPT

## 2022-11-22 PROCEDURE — 76830 TRANSVAGINAL US NON-OB: CPT

## 2022-11-22 PROCEDURE — 250N000011 HC RX IP 250 OP 636: Performed by: EMERGENCY MEDICINE

## 2022-11-22 PROCEDURE — 96375 TX/PRO/DX INJ NEW DRUG ADDON: CPT

## 2022-11-22 RX ORDER — OXYCODONE HYDROCHLORIDE 5 MG/1
5 TABLET ORAL EVERY 6 HOURS PRN
Qty: 6 TABLET | Refills: 0 | Status: SHIPPED | OUTPATIENT
Start: 2022-11-22 | End: 2022-11-25

## 2022-11-22 RX ORDER — ONDANSETRON 4 MG/1
4-8 TABLET, ORALLY DISINTEGRATING ORAL EVERY 8 HOURS PRN
Qty: 18 TABLET | Refills: 0 | Status: SHIPPED | OUTPATIENT
Start: 2022-11-22 | End: 2022-11-25

## 2022-11-22 RX ORDER — SODIUM CHLORIDE 9 MG/ML
INJECTION, SOLUTION INTRAVENOUS CONTINUOUS
Status: DISCONTINUED | OUTPATIENT
Start: 2022-11-22 | End: 2022-11-22 | Stop reason: HOSPADM

## 2022-11-22 RX ORDER — IOPAMIDOL 755 MG/ML
81 INJECTION, SOLUTION INTRAVASCULAR ONCE
Status: COMPLETED | OUTPATIENT
Start: 2022-11-22 | End: 2022-11-22

## 2022-11-22 RX ORDER — MORPHINE SULFATE 4 MG/ML
4 INJECTION, SOLUTION INTRAMUSCULAR; INTRAVENOUS
Status: DISCONTINUED | OUTPATIENT
Start: 2022-11-22 | End: 2022-11-22 | Stop reason: HOSPADM

## 2022-11-22 RX ORDER — ONDANSETRON 2 MG/ML
4 INJECTION INTRAMUSCULAR; INTRAVENOUS EVERY 30 MIN PRN
Status: DISCONTINUED | OUTPATIENT
Start: 2022-11-22 | End: 2022-11-22 | Stop reason: HOSPADM

## 2022-11-22 RX ADMIN — SODIUM CHLORIDE 63 ML: 9 INJECTION, SOLUTION INTRAVENOUS at 09:04

## 2022-11-22 RX ADMIN — SODIUM CHLORIDE 1000 ML: 9 INJECTION, SOLUTION INTRAVENOUS at 07:34

## 2022-11-22 RX ADMIN — ONDANSETRON 4 MG: 2 INJECTION INTRAMUSCULAR; INTRAVENOUS at 07:34

## 2022-11-22 RX ADMIN — IOPAMIDOL 81 ML: 755 INJECTION, SOLUTION INTRAVENOUS at 09:01

## 2022-11-22 RX ADMIN — MORPHINE SULFATE 4 MG: 4 INJECTION, SOLUTION INTRAMUSCULAR; INTRAVENOUS at 11:00

## 2022-11-22 RX ADMIN — MORPHINE SULFATE 4 MG: 4 INJECTION, SOLUTION INTRAMUSCULAR; INTRAVENOUS at 07:34

## 2022-11-22 ASSESSMENT — ENCOUNTER SYMPTOMS
HEMATURIA: 0
ABDOMINAL PAIN: 1
FEVER: 0
BACK PAIN: 0
NAUSEA: 1

## 2022-11-22 ASSESSMENT — ACTIVITIES OF DAILY LIVING (ADL)
ADLS_ACUITY_SCORE: 35

## 2022-11-22 NOTE — DISCHARGE INSTRUCTIONS
*Get plenty of rest and avoid strenuous activities.  *Take medications as prescribed.  Ibuprofen and/or tylenol for pain.  Oxycodone for pain not relieved by ibuprofen or tylenol.  Zofran as directed as needed for nausea.   *Follow-up with your doctor for a recheck within 12-36 hours.  *Return to the ER if you develop fever, worsening pain, pain that moves to the right lower abdomen, faint or feel like you will faint or become worse in any way.    Discharge Instructions  Abdominal Pain    Abdominal pain can be caused by many things. Your evaluation today does not show the exact cause for your pain. Your doctor today has decided that it is unlikely your pain is due to a life threatening problem, or a problem requiring surgery or hospital admission. Sometimes those problems cannot be found right away, so it is very important that you follow up as directed.  Sometimes only the changes which occur over time allow the cause of your pain to be found.    Return to the Emergency Department for a recheck in 8-12 hours if your pain continues.  If your pain gets worse, changes in location, or feels different, return to the Emergency Department right away.    ADULTS:  Return to the Emergency Department right away if:    You get an oral temperature above 102oF or as directed by your doctor.  You have blood in your stools (bright red or black, tarry stools).  You keep throwing up or can t drink liquids.  You see blood when you throw up.  You can t have a bowel movement or you can t pass gas.  Your stomach gets bloated or bigger.  Your skin or the whites of your eyes look yellow.  You faint.  You have bloody, frequent or painful urination.  You have new symptoms or anything that worries you.    CHILDREN:  Return to the Emergency Department right away if your child has any of the above-listed symptoms or the following:    Pushes your hand away or screams/cries when his/her belly is touched.  You notice your child is very fussy or  weak.  Your child is very tired and is too tired to eat or drink.  Your child is dehydrated.  Signs of dehydration can be:  Your infant has had no wet diapers in 4-5 hours.  Your older child has not passed urine in 6-8 hours.  Your infant or child starts to have dry mouth and lips, or no saliva or tears.    PREGNANT WOMEN:  Return to the Emergency Department right away if you have any of the above-listed symptoms or the following:    You have bleeding, leaking fluid or passing tissue from the vagina.  You have worse pain or cramping, or pain in your shoulder or back.  You have vomiting that will not stop.  You have painful or bloody urination.  You have a temperature of 100oF or more.  Your baby is not moving as much as usual.  You faint.  You get a bad headache with or without eye problems and abdominal pain.  You have a convulsion or seizure.  You have unusual discharge from your vagina and abdominal pain.    Abdominal pain is pretty common during pregnancy.  Your pain may or may not be related to your pregnancy. You should follow-up closely with your OB doctor so they can evaluate you and your baby.  Until you follow-up with your regular doctor, do the following:     Avoid sex and do not put anything in your vagina.  Drink clear fluids.  Only take medications approved by your doctor.    MORE INFORMATION:    Appendicitis:  A possible cause of abdominal pain in any person who still has their appendix is acute appendicitis. Appendicitis is often hard to diagnose.  Testing does not always rule out early appendicitis or other causes of abdominal pain. Close follow-up with your doctor and re-evaluations may be needed to figure out the reason for your abdominal pain.    Follow-up:  It is very important that you make an appointment with your clinic and go to the appointment.  If you do not follow-up with your primary doctor, it may result in missing an important development which could result in permanent injury or  "disability and/or lasting pain.  If there is any problem keeping your appointment, call your doctor or return to the Emergency Department.    Medications:  Take your medications as directed by your doctor today.  Before using over-the-counter medications, ask your doctor and make sure to take the medications as directed.  If you have any questions about medications, ask your doctor.    Diet:  Resume your normal diet as much as possible, but do not eat fried, fatty or spicy foods while you have pain.  Do not drink alcohol or have caffeine.  Do not smoke tobacco.    Probiotics: If you have been given an antibiotic, you may want to also take a probiotic pill or eat yogurt with live cultures. Probiotics have \"good bacteria\" to help your intestines stay healthy. Studies have shown that probiotics help prevent diarrhea and other intestine problems (including C. diff infection) when you take antibiotics. You can buy these without a prescription in the pharmacy section of the store.     If you were given a prescription for medicine here today, be sure to read all of the information (including the package insert) that comes with your prescription.  This will include important information about the medicine, its side effects, and any warnings that you need to know about.  The pharmacist who fills the prescription can provide more information and answer questions you may have about the medicine.  If you have questions or concerns that the pharmacist cannot address, please call or return to the Emergency Department.         Opioid Medication Information    Pain medications are among the most commonly prescribed medicines, so we are including this information for all our patients. If you did not receive pain medication or get a prescription for pain medicine, you can ignore it.     You may have been given a prescription for an opioid (narcotic) pain medicine and/or have received a pain medicine while here in the Emergency " Department. These medicines can make you drowsy or impaired. You must not drive, operate dangerous equipment, or engage in any other dangerous activities while taking these medications. If you drive while taking these medications, you could be arrested for DUI, or driving under the influence. Do not drink any alcohol while you are taking these medications.     Opioid pain medications can cause addiction. If you have a history of chemical dependency of any type, you are at a higher risk of becoming addicted to pain medications.  Only take these prescribed medications to treat your pain when all other options have been tried. Take it for as short a time and as few doses as possible. Store your pain pills in a secure place, as they are frequently stolen and provide a dangerous opportunity for children or visitors in your house to start abusing these powerful medications. We will not replace any lost or stolen medicine.  As soon as your pain is better, you should flush all your remaining medication.     Many prescription pain medications contain Tylenol  (acetaminophen), including Vicodin , Tylenol #3 , Norco , Lortab , and Percocet .  You should not take any extra pills of Tylenol  if you are using these prescription medications or you can get very sick.  Do not ever take more than 3000 mg of acetaminophen in any 24 hour period.    All opioids tend to cause constipation. Drink plenty of water and eat foods that have a lot of fiber, such as fruits, vegetables, prune juice, apple juice and high fiber cereal.  Take a laxative if you don t move your bowels at least every other day. Miralax , Milk of Magnesia, Colace , or Senna  can be used to keep you regular.      Remember that you can always come back to the Emergency Department if you are not able to see your regular doctor in the amount of time listed above, if you get any new symptoms, or if there is anything that worries you.        Discharge Instructions  Abdominal  Pain    Abdominal pain can be caused by many things. Your evaluation today does not show the exact cause for your pain. Your doctor today has decided that it is unlikely your pain is due to a life threatening problem, or a problem requiring surgery or hospital admission. Sometimes those problems cannot be found right away, so it is very important that you follow up as directed.  Sometimes only the changes which occur over time allow the cause of your pain to be found.    Return to the Emergency Department for a recheck in 8-12 hours if your pain continues.  If your pain gets worse, changes in location, or feels different, return to the Emergency Department right away.    ADULTS:  Return to the Emergency Department right away if:    You get an oral temperature above 102oF or as directed by your doctor.  You have blood in your stools (bright red or black, tarry stools).  You keep throwing up or can t drink liquids.  You see blood when you throw up.  You can t have a bowel movement or you can t pass gas.  Your stomach gets bloated or bigger.  Your skin or the whites of your eyes look yellow.  You faint.  You have bloody, frequent or painful urination.  You have new symptoms or anything that worries you.    CHILDREN:  Return to the Emergency Department right away if your child has any of the above-listed symptoms or the following:    Pushes your hand away or screams/cries when his/her belly is touched.  You notice your child is very fussy or weak.  Your child is very tired and is too tired to eat or drink.  Your child is dehydrated.  Signs of dehydration can be:  Your infant has had no wet diapers in 4-5 hours.  Your older child has not passed urine in 6-8 hours.  Your infant or child starts to have dry mouth and lips, or no saliva or tears.    PREGNANT WOMEN:  Return to the Emergency Department right away if you have any of the above-listed symptoms or the following:    You have bleeding, leaking fluid or passing  tissue from the vagina.  You have worse pain or cramping, or pain in your shoulder or back.  You have vomiting that will not stop.  You have painful or bloody urination.  You have a temperature of 100oF or more.  Your baby is not moving as much as usual.  You faint.  You get a bad headache with or without eye problems and abdominal pain.  You have a convulsion or seizure.  You have unusual discharge from your vagina and abdominal pain.    Abdominal pain is pretty common during pregnancy.  Your pain may or may not be related to your pregnancy. You should follow-up closely with your OB doctor so they can evaluate you and your baby.  Until you follow-up with your regular doctor, do the following:     Avoid sex and do not put anything in your vagina.  Drink clear fluids.  Only take medications approved by your doctor.    MORE INFORMATION:    Appendicitis:  A possible cause of abdominal pain in any person who still has their appendix is acute appendicitis. Appendicitis is often hard to diagnose.  Testing does not always rule out early appendicitis or other causes of abdominal pain. Close follow-up with your doctor and re-evaluations may be needed to figure out the reason for your abdominal pain.    Follow-up:  It is very important that you make an appointment with your clinic and go to the appointment.  If you do not follow-up with your primary doctor, it may result in missing an important development which could result in permanent injury or disability and/or lasting pain.  If there is any problem keeping your appointment, call your doctor or return to the Emergency Department.    Medications:  Take your medications as directed by your doctor today.  Before using over-the-counter medications, ask your doctor and make sure to take the medications as directed.  If you have any questions about medications, ask your doctor.    Diet:  Resume your normal diet as much as possible, but do not eat fried, fatty or spicy foods  "while you have pain.  Do not drink alcohol or have caffeine.  Do not smoke tobacco.    Probiotics: If you have been given an antibiotic, you may want to also take a probiotic pill or eat yogurt with live cultures. Probiotics have \"good bacteria\" to help your intestines stay healthy. Studies have shown that probiotics help prevent diarrhea and other intestine problems (including C. diff infection) when you take antibiotics. You can buy these without a prescription in the pharmacy section of the store.     If you were given a prescription for medicine here today, be sure to read all of the information (including the package insert) that comes with your prescription.  This will include important information about the medicine, its side effects, and any warnings that you need to know about.  The pharmacist who fills the prescription can provide more information and answer questions you may have about the medicine.  If you have questions or concerns that the pharmacist cannot address, please call or return to the Emergency Department.         Opioid Medication Information    Pain medications are among the most commonly prescribed medicines, so we are including this information for all our patients. If you did not receive pain medication or get a prescription for pain medicine, you can ignore it.     You may have been given a prescription for an opioid (narcotic) pain medicine and/or have received a pain medicine while here in the Emergency Department. These medicines can make you drowsy or impaired. You must not drive, operate dangerous equipment, or engage in any other dangerous activities while taking these medications. If you drive while taking these medications, you could be arrested for DUI, or driving under the influence. Do not drink any alcohol while you are taking these medications.     Opioid pain medications can cause addiction. If you have a history of chemical dependency of any type, you are at a higher risk " of becoming addicted to pain medications.  Only take these prescribed medications to treat your pain when all other options have been tried. Take it for as short a time and as few doses as possible. Store your pain pills in a secure place, as they are frequently stolen and provide a dangerous opportunity for children or visitors in your house to start abusing these powerful medications. We will not replace any lost or stolen medicine.  As soon as your pain is better, you should flush all your remaining medication.     Many prescription pain medications contain Tylenol  (acetaminophen), including Vicodin , Tylenol #3 , Norco , Lortab , and Percocet .  You should not take any extra pills of Tylenol  if you are using these prescription medications or you can get very sick.  Do not ever take more than 3000 mg of acetaminophen in any 24 hour period.    All opioids tend to cause constipation. Drink plenty of water and eat foods that have a lot of fiber, such as fruits, vegetables, prune juice, apple juice and high fiber cereal.  Take a laxative if you don t move your bowels at least every other day. Miralax , Milk of Magnesia, Colace , or Senna  can be used to keep you regular.      Remember that you can always come back to the Emergency Department if you are not able to see your regular doctor in the amount of time listed above, if you get any new symptoms, or if there is anything that worries you.

## 2022-11-22 NOTE — ED PROVIDER NOTES
History   Chief Complaint:  Abdominal Pain       The history is provided by the patient.      Renetta Sears is a 47 year old female with history of cholecystectomy, hysterectomy, and hernia repair who presents with abdominal pain. Renetta reports that she developed sharp RLQ abdominal pain in the middle of the night, causing her to wake around 0300 this morning. Her pain is currently 9/10 and making her nauseous. She has taken ibuprofen for her pain. She has a history of vaginal hysterectomy in 2019, cholecystectomy 11-12 years ago, hernia repair, and ovarian cysts. She still has her appendix. She is allergic to estrogens, but does not have any medical problems. She denies fever, back pain, or hematuria.    Review of Systems   Constitutional: Negative for fever.   Gastrointestinal: Positive for abdominal pain (RLQ) and nausea.   Genitourinary: Negative for hematuria.   Musculoskeletal: Negative for back pain.   All other systems reviewed and are negative.    Allergies:  Estrogens    Medications:  Elavil  Flexeril  Monodox  Ferosul  Diflucan  Atarax  Methylprednisone  Singulair  Spironolactone  Valtrex    Past Medical History:     Depression  Anxiety  Obesity  Chronic headache   PE  Herpes simplex  Hidradenitis suppurativa  Hyperlipidemia    Past Surgical History:    Cervix repair  Cholecystectomy  Ventral herniorrhaphy  Hysterectomy  Tubal ligation  Tonsillectomy     Family History:    Father: depression, diabetes, hypertension, hypercholesteremia, rheumatoid arthritis  Mother: arthritis, thyroid disease, depression, hypercholesteremia    Social History:  The patient presents to the ED alone.  PCP: Merrill Iqbal     Physical Exam     Patient Vitals for the past 24 hrs:   BP Temp Temp src Pulse Resp SpO2 Height Weight   11/22/22 1059 -- -- -- -- -- 98 % -- --   11/22/22 1058 110/73 -- -- 64 -- -- -- --   11/22/22 0931 100/51 -- -- 65 -- 99 % -- --   11/22/22 0849 -- -- -- -- -- 97 % -- --   11/22/22 0747  "-- -- -- -- -- 94 % -- --   11/22/22 0743 -- -- -- -- -- 99 % -- --   11/22/22 0742 113/77 -- -- 69 -- -- -- --   11/22/22 0534 120/66 98.2  F (36.8  C) Oral 76 20 99 % 1.676 m (5' 6\") 73.5 kg (162 lb)       Physical Exam  General: Well-nourished, appears to be in pain   eyes: PERRL, conjunctivae pink no scleral icterus or conjunctival injection  ENT:  Moist mucus membranes, posterior oropharynx clear without erythema or exudates  Respiratory:  Lungs clear to auscultation bilaterally, no crackles/rubs/wheezes.  Good air movement  CV: Normal rate and rhythm, no murmurs/rubs/gallops  GI:  Abdomen soft and non-distended.  Normoactive BS.  Moderate right lower quadrant tenderness.  No guarding or rebound.  Skin: Warm, dry.  No rashes or petechiae  Musculoskeletal: No peripheral edema or calf tenderness  Neuro: Alert and oriented to person/place/time  Psychiatric: Normal affect    Emergency Department Course     Imaging:  US Pelvis Cmplt w Transvag & Doppler LmtPel Duplex Limited   Final Result   IMPRESSION:   1.  No acute sonographic abnormality in the pelvis.   2.  Postoperative changes of prior hysterectomy.   3.  Nonvisualization of the right ovary.       JOSE LUIS ZHAO MD            SYSTEM ID:  [id number]      CT Abdomen Pelvis w Contrast   Final Result   IMPRESSION: No acute abnormality in the abdomen or pelvis. No cause   for right lower quadrant pain is identified.       JOSE LUIS ZHAO MD            SYSTEM ID:  [id number]        Report per radiology    Laboratory:  Labs Ordered and Resulted from Time of ED Arrival to Time of ED Departure   COMPREHENSIVE METABOLIC PANEL - Abnormal       Result Value    Sodium 137      Potassium 3.7      Chloride 106      Carbon Dioxide (CO2) 27      Anion Gap 4      Urea Nitrogen 10      Creatinine 0.72      Calcium 9.3      Glucose 108 (*)     Alkaline Phosphatase 46      AST 25      ALT 44      Protein Total 7.1      Albumin 3.7      Bilirubin Total 1.0      GFR Estimate >90   "   LIPASE - Abnormal    Lipase 47 (*)    UA MACROSCOPIC WITH REFLEX TO MICRO AND CULTURE - Abnormal    Color Urine Yellow      Appearance Urine Clear      Glucose Urine Negative      Bilirubin Urine Negative      Ketones Urine Negative      Specific Gravity Urine 1.032      Blood Urine Negative      pH Urine 6.0      Protein Albumin Urine 20 (*)     Urobilinogen Urine Normal      Nitrite Urine Negative      Leukocyte Esterase Urine Small (*)    CBC WITH PLATELETS AND DIFFERENTIAL    WBC Count 4.2      RBC Count 4.41      Hemoglobin 14.2      Hematocrit 40.3      MCV 91      MCH 32.2      MCHC 35.2      RDW 11.8      Platelet Count 237      % Neutrophils 48      % Lymphocytes 41      % Monocytes 7      % Eosinophils 3      % Basophils 1      % Immature Granulocytes 0      NRBCs per 100 WBC 0      Absolute Neutrophils 2.0      Absolute Lymphocytes 1.7      Absolute Monocytes 0.3      Absolute Eosinophils 0.1      Absolute Basophils 0.0      Absolute Immature Granulocytes 0.0      Absolute NRBCs 0.0        Emergency Department Course:       Reviewed:  I reviewed nursing notes, vitals, past medical history and Care Everywhere    Assessments:  0706 I obtained history and examined the patient as noted above.   0948 I rechecked the patient and explained findings.   1120 I rechecked the patient and discussed discharge.    Interventions:  0734 Zofran 4 mg IV  0734 Morphine 4 mg IV  0734 NS Bolus 1 L IV  1100 Morphine 4 mg IV    Disposition:  The patient was discharged to home.     Impression & Plan     Medical Decision Making:  Renetta Sears is a 47 year old female with abdominal pain.  A broad differential diagnosis was considered including appendicitis versus PUD vs gastritis vs pancreatitis vs perforated viscus vs bilary colic vs obstruction vs ovarian pathology including ovarian cyst versus torsion.  Lipase is normal so I doubt pancreatitis.  No RUQ pain or elevated of biliary or liver enzymes so I doubt biliary  colic.  The patient is status post hysterectomy.  The CT scan shows no obstruction, colitis, diverticulitis or signs of appendicitis.  Of note, the appendix was not identified but there were no inflammatory changes and I think it is quite unlikely.  Her white count is normal and she is afebrile which also decreases my suspicion for appendicitis.  An ultrasound was obtained to evaluate for ovarian pathology.  There were no large ovarian cyst seen on the CT scan.  The ultrasound showed normal flow in the left ovary.  The right ovary was not identified but there was certainly no apparent ovarian enlargement or large cysts that were apparent on the ultrasound.  I have low suspicion for ovarian torsion.  Her urinalysis is bland without signs of hematuria to suggest a kidney stone or urinary tract infection.  No signs of hydronephrosis or other signs of kidney stone were seen on the CT scan.  The workup in the ED is at this point negative.  No etiology for the patients pain is found at this point and my suspicion of an intraabdominal catastrophe or other worrisome etiology is very low.  I will not therefore admit for serial exams and further workup.  Patient is hemodynamically stable in ED. Plan is home with 12 hour abdominal pain recheck by primary care physician or return to ED at that time.  Return for fevers greater than 102, increasing pain, other new symptoms develop.  Abdominal pain handout given.  Questions were answered.     Diagnosis:    ICD-10-CM    1. Abdominal pain, right lower quadrant  R10.31           Discharge Medications:  Discharge Medication List as of 11/22/2022 11:27 AM      START taking these medications    Details   ondansetron (ZOFRAN ODT) 4 MG ODT tab Take 1-2 tablets (4-8 mg) by mouth every 8 hours as needed for nausea or vomiting, Disp-18 tablet, R-0, E-Prescribe      oxyCODONE (ROXICODONE) 5 MG tablet Take 1 tablet (5 mg) by mouth every 6 hours as needed for severe pain (7-10), Disp-6 tablet,  R-0, E-Prescribe             Scribe Disclosure:  I, Dannie Nelson, am serving as a scribe at 6:58 AM on 11/22/2022 to document services personally performed by Claire Martinez MD based on my observations and the provider's statements to me.          Claire Martinez MD  11/22/22 1029

## 2022-11-22 NOTE — LETTER
November 22, 2022      To Whom It May Concern:      Renetta Sears was seen in our Emergency Department today, 11/22/22.  I expect her condition to improve over the next 2-5 days.  She may return to work when improved.    Sincerely,        Yosi Caba RN

## 2022-11-22 NOTE — ED TRIAGE NOTES
Patient here with right side abdominal pain with nausea which started this morning     Triage Assessment     Row Name 11/22/22 0536       Triage Assessment (Adult)    Airway WDL WDL       Respiratory WDL    Respiratory WDL WDL       Skin Circulation/Temperature WDL    Skin Circulation/Temperature WDL WDL       Cardiac WDL    Cardiac WDL WDL       Peripheral/Neurovascular WDL    Peripheral Neurovascular WDL WDL       Cognitive/Neuro/Behavioral WDL    Cognitive/Neuro/Behavioral WDL WDL

## 2023-01-11 ENCOUNTER — APPOINTMENT (OUTPATIENT)
Dept: CT IMAGING | Facility: CLINIC | Age: 48
End: 2023-01-11
Attending: EMERGENCY MEDICINE
Payer: COMMERCIAL

## 2023-01-11 ENCOUNTER — HOSPITAL ENCOUNTER (EMERGENCY)
Facility: CLINIC | Age: 48
Discharge: HOME | End: 2023-01-11
Attending: EMERGENCY MEDICINE | Admitting: EMERGENCY MEDICINE
Payer: COMMERCIAL

## 2023-01-11 VITALS
OXYGEN SATURATION: 96 % | HEART RATE: 76 BPM | BODY MASS INDEX: 27.32 KG/M2 | DIASTOLIC BLOOD PRESSURE: 66 MMHG | SYSTOLIC BLOOD PRESSURE: 102 MMHG | WEIGHT: 170 LBS | RESPIRATION RATE: 16 BRPM | HEIGHT: 66 IN | TEMPERATURE: 98.2 F

## 2023-01-11 DIAGNOSIS — J06.9 VIRAL URI WITH COUGH: ICD-10-CM

## 2023-01-11 DIAGNOSIS — R07.9 CHEST PAIN, UNSPECIFIED TYPE: ICD-10-CM

## 2023-01-11 DIAGNOSIS — E04.1 THYROID NODULE: ICD-10-CM

## 2023-01-11 LAB
ALBUMIN SERPL-MCNC: 3.7 G/DL (ref 3.4–5)
ALP SERPL-CCNC: 49 U/L (ref 40–150)
ALT SERPL W P-5'-P-CCNC: 30 U/L (ref 0–50)
ANION GAP SERPL CALCULATED.3IONS-SCNC: 7 MMOL/L (ref 3–14)
AST SERPL W P-5'-P-CCNC: 12 U/L (ref 0–45)
ATRIAL RATE - MUSE: 79 BPM
BASOPHILS # BLD AUTO: 0.1 10E3/UL (ref 0–0.2)
BASOPHILS NFR BLD AUTO: 1 %
BILIRUB SERPL-MCNC: 1.1 MG/DL (ref 0.2–1.3)
BUN SERPL-MCNC: 12 MG/DL (ref 7–30)
CALCIUM SERPL-MCNC: 8.9 MG/DL (ref 8.5–10.1)
CHLORIDE BLD-SCNC: 103 MMOL/L (ref 94–109)
CO2 SERPL-SCNC: 27 MMOL/L (ref 20–32)
CREAT SERPL-MCNC: 0.79 MG/DL (ref 0.52–1.04)
DIASTOLIC BLOOD PRESSURE - MUSE: NORMAL MMHG
EOSINOPHIL # BLD AUTO: 0.2 10E3/UL (ref 0–0.7)
EOSINOPHIL NFR BLD AUTO: 3 %
ERYTHROCYTE [DISTWIDTH] IN BLOOD BY AUTOMATED COUNT: 11.9 % (ref 10–15)
FLUAV RNA SPEC QL NAA+PROBE: NEGATIVE
FLUBV RNA RESP QL NAA+PROBE: NEGATIVE
GFR SERPL CREATININE-BSD FRML MDRD: >90 ML/MIN/1.73M2
GLUCOSE BLD-MCNC: 107 MG/DL (ref 70–99)
HCO3 BLDV-SCNC: 25 MMOL/L (ref 21–28)
HCT VFR BLD AUTO: 40 % (ref 35–47)
HGB BLD-MCNC: 13.7 G/DL (ref 11.7–15.7)
HOLD SPECIMEN: NORMAL
IMM GRANULOCYTES # BLD: 0 10E3/UL
IMM GRANULOCYTES NFR BLD: 0 %
INTERPRETATION ECG - MUSE: NORMAL
LACTATE BLD-SCNC: 0.4 MMOL/L
LYMPHOCYTES # BLD AUTO: 1.8 10E3/UL (ref 0.8–5.3)
LYMPHOCYTES NFR BLD AUTO: 35 %
MCH RBC QN AUTO: 31.6 PG (ref 26.5–33)
MCHC RBC AUTO-ENTMCNC: 34.3 G/DL (ref 31.5–36.5)
MCV RBC AUTO: 92 FL (ref 78–100)
MONOCYTES # BLD AUTO: 0.6 10E3/UL (ref 0–1.3)
MONOCYTES NFR BLD AUTO: 11 %
NEUTROPHILS # BLD AUTO: 2.6 10E3/UL (ref 1.6–8.3)
NEUTROPHILS NFR BLD AUTO: 50 %
NRBC # BLD AUTO: 0 10E3/UL
NRBC BLD AUTO-RTO: 0 /100
P AXIS - MUSE: 76 DEGREES
PCO2 BLDV: 40 MM HG (ref 40–50)
PH BLDV: 7.4 [PH] (ref 7.32–7.43)
PLATELET # BLD AUTO: 249 10E3/UL (ref 150–450)
PO2 BLDV: 76 MM HG (ref 25–47)
POTASSIUM BLD-SCNC: 3.5 MMOL/L (ref 3.4–5.3)
PR INTERVAL - MUSE: 162 MS
PROT SERPL-MCNC: 7 G/DL (ref 6.8–8.8)
QRS DURATION - MUSE: 80 MS
QT - MUSE: 402 MS
QTC - MUSE: 460 MS
R AXIS - MUSE: 62 DEGREES
RBC # BLD AUTO: 4.33 10E6/UL (ref 3.8–5.2)
RSV RNA SPEC NAA+PROBE: NEGATIVE
SAO2 % BLDV: 95 % (ref 94–100)
SARS-COV-2 RNA RESP QL NAA+PROBE: NEGATIVE
SODIUM SERPL-SCNC: 137 MMOL/L (ref 133–144)
SYSTOLIC BLOOD PRESSURE - MUSE: NORMAL MMHG
T AXIS - MUSE: 60 DEGREES
TROPONIN I SERPL HS-MCNC: 4 NG/L
VENTRICULAR RATE- MUSE: 79 BPM
WBC # BLD AUTO: 5.2 10E3/UL (ref 4–11)

## 2023-01-11 PROCEDURE — 99285 EMERGENCY DEPT VISIT HI MDM: CPT | Mod: 25

## 2023-01-11 PROCEDURE — 36415 COLL VENOUS BLD VENIPUNCTURE: CPT | Performed by: EMERGENCY MEDICINE

## 2023-01-11 PROCEDURE — 71275 CT ANGIOGRAPHY CHEST: CPT

## 2023-01-11 PROCEDURE — 82803 BLOOD GASES ANY COMBINATION: CPT

## 2023-01-11 PROCEDURE — 84484 ASSAY OF TROPONIN QUANT: CPT | Performed by: EMERGENCY MEDICINE

## 2023-01-11 PROCEDURE — 96374 THER/PROPH/DIAG INJ IV PUSH: CPT | Mod: 59

## 2023-01-11 PROCEDURE — 85025 COMPLETE CBC W/AUTO DIFF WBC: CPT | Performed by: EMERGENCY MEDICINE

## 2023-01-11 PROCEDURE — 250N000009 HC RX 250: Performed by: EMERGENCY MEDICINE

## 2023-01-11 PROCEDURE — 250N000011 HC RX IP 250 OP 636: Performed by: EMERGENCY MEDICINE

## 2023-01-11 PROCEDURE — 93005 ELECTROCARDIOGRAM TRACING: CPT

## 2023-01-11 PROCEDURE — C9803 HOPD COVID-19 SPEC COLLECT: HCPCS

## 2023-01-11 PROCEDURE — 87637 SARSCOV2&INF A&B&RSV AMP PRB: CPT | Performed by: EMERGENCY MEDICINE

## 2023-01-11 PROCEDURE — 80053 COMPREHEN METABOLIC PANEL: CPT | Performed by: EMERGENCY MEDICINE

## 2023-01-11 RX ORDER — KETOROLAC TROMETHAMINE 15 MG/ML
15 INJECTION, SOLUTION INTRAMUSCULAR; INTRAVENOUS ONCE
Status: COMPLETED | OUTPATIENT
Start: 2023-01-11 | End: 2023-01-11

## 2023-01-11 RX ORDER — IOPAMIDOL 755 MG/ML
65 INJECTION, SOLUTION INTRAVASCULAR ONCE
Status: COMPLETED | OUTPATIENT
Start: 2023-01-11 | End: 2023-01-11

## 2023-01-11 RX ADMIN — KETOROLAC TROMETHAMINE 15 MG: 15 INJECTION, SOLUTION INTRAMUSCULAR; INTRAVENOUS at 01:30

## 2023-01-11 RX ADMIN — SODIUM CHLORIDE 90 ML: 900 INJECTION INTRAVENOUS at 01:48

## 2023-01-11 RX ADMIN — IOPAMIDOL 65 ML: 755 INJECTION, SOLUTION INTRAVENOUS at 01:48

## 2023-01-11 ASSESSMENT — ENCOUNTER SYMPTOMS
SORE THROAT: 1
RHINORRHEA: 0
MYALGIAS: 0
VOMITING: 0
NAUSEA: 1
HEADACHES: 1
DIARRHEA: 0
COUGH: 1

## 2023-01-11 ASSESSMENT — ACTIVITIES OF DAILY LIVING (ADL): ADLS_ACUITY_SCORE: 35

## 2023-01-11 NOTE — DISCHARGE INSTRUCTIONS
Discharge Instructions  Upper Respiratory Infection    The upper respiratory tract includes the sinuses, nasal passages, pharynx, and larynx. A URI, or upper respiratory infection, is an infection of any of the parts of the upper airway. Symptoms include runny nose, congestion, sneezing, sore throat, cough, and fever. URIs are almost always caused by a virus. Antibiotics do not help with viral infections, so are generally not prescribed. A URI is very contagious through coughing and nasal secretions; make sure you wash your hands often and clean surfaces after sneezing, coughing or touching them. While you should start to improve in 3 - 5 days, remember that sometimes a cough can linger for several weeks.    Generally, every Emergency Department visit should have a follow-up clinic visit with either a primary or a specialty clinic/provider. Please follow-up as instructed by your emergency provider today.    Return to the Emergency Department if:  Any of your symptoms get much worse.  You seem very sick, like being too weak to get up.  You have chest pain or shortness of breath.   You have a severe headache.  You are vomiting (throwing up) so much you cannot keep fluids or medicines down.  You have confusion or seem unusually drowsy.  You have a seizure.    What can I do to help myself?  Fill any prescriptions the provider gave you and take them right away  If you have a fever, get plenty of rest and drink lots of fluids, especially water.  Using a humidifier or saline nose spray will also help loosen mucous.   Clothes or blankets will not change your fever. Do what is comfortable for you.  Bathing or sponging in lukewarm water may help you feel better.  Acetaminophen (Tylenol ) or ibuprofen (Advil , Motrin ) will help bring fever down and may help you feel more comfortable. Be sure to read and follow the package directions, and ask your provider if you have questions.  Do not drink alcohol.  Decongestants may help  you feel better. You may use decongestant nose sprays Afrin  (oxymetazoline) or Jeremy-Synephrine  (phenylephrine hydrochloride) for up to 3 days, or may use a decongestant tablet like Sudafed  (pseudoephedrine).  If you were given a prescription for medicine here today, be sure to read all of the information (including the package insert) that comes with your prescription.  This will include important information about the medicine, its side effects, and any warnings that you need to know about.  The pharmacist who fills the prescription can provide more information and answer questions you may have about the medicine.  If you have questions or concerns that the pharmacist cannot address, please call or return to the Emergency Department.   Remember that you can always come back to the Emergency Department if you are not able to see your regular provider in the amount of time listed above, if you get any new symptoms, or if there is anything that worries you.    Discharge Instructions  Chest Pain    You have been seen today for chest pain or discomfort.  At this time, your provider has found no signs that your chest pain is due to a serious or life-threatening condition, (or you have declined more testing and/or admission to the hospital). However, sometimes there is a serious problem that does not show up right away. Your evaluation today may not be complete and you may need further testing and evaluation.     Generally, every Emergency Department visit should have a follow-up clinic visit with either a primary or a specialty clinic/provider. Please follow-up as instructed by your emergency provider today.  Return to the Emergency Department if:  Your chest pain changes, gets worse, starts to happen more often, or comes with less activity.  You are newly short of breath.  You get very weak or tired.  You pass out or faint.  You have any new symptoms, like fever, cough, numb legs, or you cough up blood.  You have  anything else that worries you.    Until you follow-up with your regular provider, please do the following:  Take one aspirin daily unless you have an allergy or are told not to by your provider.  If a stress test appointment has been made, go to the appointment.  If you have questions, contact your regular provider.  Follow-up with your regular provider/clinic as directed; this is very important.    If you were given a prescription for medicine here today, be sure to read all of the information (including the package insert) that comes with your prescription.  This will include important information about the medicine, its side effects, and any warnings that you need to know about.  The pharmacist who fills the prescription can provide more information and answer questions you may have about the medicine.  If you have questions or concerns that the pharmacist cannot address, please call or return to the Emergency Department.       Remember that you can always come back to the Emergency Department if you are not able to see your regular provider in the amount of time listed above, if you get any new symptoms, or if there is anything that worries you.

## 2023-01-11 NOTE — ED PROVIDER NOTES
History   Chief Complaint:  Cough and chest    The history is provided by the patient and medical records.      Renetta Sears is a 47 year old female who presents with chest pain and a cough. The patient states she had an onset of cough 4 days ago. This was followed by onset of constant burning tight chest pain this evening. She states chest pain is a 10/10 pain. She endorses a fever today of 102 at 1700. She has taken Tylenol for this. She endorses associated nausea, sore throat, and headache as well. She denies emesis or diarrhea. She denies congestion and rhinorrhea. She denies myalgias. She endorses history of pulmonary embolism. She is not currently anticoagulated.     Review of Systems   HENT: Positive for sore throat. Negative for congestion and rhinorrhea.    Respiratory: Positive for cough.    Cardiovascular: Positive for chest pain.   Gastrointestinal: Positive for nausea. Negative for diarrhea and vomiting.   Musculoskeletal: Negative for myalgias.   Neurological: Positive for headaches.   All other systems reviewed and are negative.    Allergies:  Estrogens  Medroxyprogesterone     Medications:    Elavil   Zyrtec  Flexeril  Atarax  Singulair  Medrol Dosepak  Singulair  Aldactone  Valtrex     Past Medical History:    Anxiety disorder  Chronic headaches  Depression   Esophagitis medicamentosa   Herpes simplex virus infection  Hidradenitis  Hyperlipidemia  Obesity   Pulmonary embolism and infarction     Past Surgical History:    Cervix surgery   Cholecystectomy  DaVinci herniorrhaphy, ventral  Tonsillectomy   Adenoidectomy  Hysterectomy with bilateral salpingectomy cystoscopy   Cholecystectomy  Tubal ligation      Family History:    family history includes Arthritis in her maternal grandmother and mother; Breast Cancer in an other family member; Cancer - colorectal in her paternal uncle; Cardiovascular in her maternal grandmother; Depression in her paternal grandmother; Diabetes in her maternal  "grandmother and paternal grandmother; Hypertension in her maternal grandfather and maternal grandmother; Thyroid Disease in her maternal grandmother and mother.    Social History:  The patient was unaccompanied to the emergency department.   reports that she has never smoked. She has never used smokeless tobacco. She reports current alcohol use. She reports that she does not use drugs.  PCP: Merrill Iqbal     Physical Exam     Patient Vitals for the past 24 hrs:   BP Temp Temp src Pulse Resp SpO2 Height Weight   01/11/23 0213 106/70 -- -- 71 -- 94 % -- --   01/11/23 0145 91/63 -- -- -- -- 98 % -- --   01/11/23 0006 119/54 98.2  F (36.8  C) Oral 71 16 96 % 1.676 m (5' 6\") 77.1 kg (170 lb)      Physical Exam  Nursing note and vitals reviewed.  Constitutional:  Oriented to person, place, and time. Cooperative.   HENT:   Nose:    Nose normal.   Mouth/Throat:   Face mask in place.    Eyes:    Conjunctivae normal and EOM are normal.      Pupils are equal, round, and reactive to light.   Neck:    Trachea normal.   Cardiovascular:  Normal rate, regular rhythm, normal heart sounds and normal pulses. No murmur heard.  Pulmonary/Chest:  Effort normal and breath sounds normal.   Abdominal:   Soft. Normal appearance and bowel sounds are normal.      There is no tenderness.      There is no rebound and no CVA tenderness.   Musculoskeletal:  Extremities atraumatic x 4.   Lymphadenopathy:  No cervical adenopathy.   Neurological:   Alert and oriented to person, place, and time. Normal strength.      No cranial nerve deficit or sensory deficit. GCS eye subscore is 4. GCS verbal subscore is 5. GCS motor subscore is 6.   Skin:    Skin is intact. No rash noted.   Psychiatric:   Normal mood and affect.    Emergency Department Course   ECG:  ECG results from 01/11/23   EKG 12-lead, tracing only     Value    Systolic Blood Pressure     Diastolic Blood Pressure     Ventricular Rate 79    Atrial Rate 79    OK Interval 162    QRS Duration " 80        QTc 460    P Axis 76    R AXIS 62    T Axis 60    Interpretation ECG      Sinus rhythm with sinus arrhythmia  Normal ECG  When compared with ECG of 25-FEB-2022 19:35,  No significant change was found  Confirmed by GENERATED REPORT, COMPUTER ([id number]),  Juan Gupta ([id number]) on 1/11/2023 12:50:49 AM       Imaging:  CT Chest Pulmonary Embolism w Contrast   Final Result   IMPRESSION:   1.  No pulmonary embolus or other acute process in the chest.   2.  2.8 cm left thyroid nodule similar to 02/25/2022. This would be better evaluated by nonemergent ultrasound.      Report per radiology    Laboratory:  Labs Ordered and Resulted from Time of ED Arrival to Time of ED Departure   COMPREHENSIVE METABOLIC PANEL - Abnormal       Result Value    Sodium 137      Potassium 3.5      Chloride 103      Carbon Dioxide (CO2) 27      Anion Gap 7      Urea Nitrogen 12      Creatinine 0.79      Calcium 8.9      Glucose 107 (*)     Alkaline Phosphatase 49      AST 12      ALT 30      Protein Total 7.0      Albumin 3.7      Bilirubin Total 1.1      GFR Estimate >90     ISTAT GASES LACTATE VENOUS POCT - Abnormal    Lactic Acid POCT 0.4      Bicarbonate Venous POCT 25      O2 Sat, Venous POCT 95      pCO2V Venous POCT 40      pH Venous POCT 7.40      pO2 Venous POCT 76 (*)    TROPONIN I - Normal    Troponin I High Sensitivity 4     INFLUENZA A/B & SARS-COV2 PCR MULTIPLEX - Normal    Influenza A PCR Negative      Influenza B PCR Negative      RSV PCR Negative      SARS CoV2 PCR Negative     CBC WITH PLATELETS AND DIFFERENTIAL    WBC Count 5.2      RBC Count 4.33      Hemoglobin 13.7      Hematocrit 40.0      MCV 92      MCH 31.6      MCHC 34.3      RDW 11.9      Platelet Count 249      % Neutrophils 50      % Lymphocytes 35      % Monocytes 11      % Eosinophils 3      % Basophils 1      % Immature Granulocytes 0      NRBCs per 100 WBC 0      Absolute Neutrophils 2.6      Absolute Lymphocytes 1.8      Absolute Monocytes  0.6      Absolute Eosinophils 0.2      Absolute Basophils 0.1      Absolute Immature Granulocytes 0.0      Absolute NRBCs 0.0        Emergency Department Course & Assessments:     Interventions:  Medications   ketorolac (TORADOL) injection 15 mg (15 mg Intravenous Given 1/11/23 0130)   iopamidol (ISOVUE-370) solution 65 mL (65 mLs Intravenous Given 1/11/23 0148)   Saline (90 mLs Intravenous Given 1/11/23 0148)      Independent Interpretation (X-rays, CTs, rhythm strip):  I reviewed the patient's chest CT.      Consultations/Discussion of Management or Tests:  0111 I obtained history and performed an exam of the patient as documented above.   0232 I rechecked the patient and explained results. We discussed plan of care.     Disposition:  The patient was discharged to home.     Impression & Plan    Medical Decision Making:  This is a 47-year-old female who came in for further evaluation of chest pain in the setting of a few days of URI symptoms and a cough and fever.  She had an EKG obtained, which was unremarkable.  My suspicion for cardiac causes was low, but I still considered it has a possible cause of her chest pain.  I felt it was more likely that her chest pain was related to her cough and URI, but I also considered the possibility of a PE, especially given her history.  Additionally, I was concerned she might have pneumonia or possibly influenza, COVID, or RSV.  I proceeded with the above blood work and CT scan of her chest.  Her work-up here came back unremarkable including all of her blood work and a CT scan.  She does not have pneumonia or pulmonary embolism.  I indicated that she likely has a viral syndrome, although she is at higher risk for developing pneumonia.  I also informed her of the thyroid nodule and the fact that it is unchanged in size.  I recommended she discuss that further with her primary physician and follow-up with her physician if she is not improving as well.  She should return here  with any concerns or worsening symptoms.    Diagnosis:    ICD-10-CM    1. Viral URI with cough  J06.9       2. Chest pain, unspecified type  R07.9       3. Thyroid nodule  E04.1           Scribe Disclosure:  I, Kayli Frederick, am serving as a scribe at 12:51 AM on 1/11/2023 to document services personally performed by Ty Alexander MD based on my observations and the provider's statements to me.     Ty Alexander MD  01/11/23 0559

## 2023-01-11 NOTE — ED TRIAGE NOTES
Patient states cough x4 days.  Tonight woke up from sleep with chest pain.  Burning, & tightness across chest.  Hx of PE.  Not on blood thinners.      Triage Assessment     Row Name 01/11/23 0004       Triage Assessment (Adult)    Airway WDL WDL       Respiratory WDL    Respiratory WDL X;cough;all    Rhythm/Pattern, Respiratory shortness of breath    Cough Frequency frequent       Skin Circulation/Temperature WDL    Skin Circulation/Temperature WDL WDL       Cardiac WDL    Cardiac WDL X;chest pain       Chest Pain Assessment    Chest Pain Location anterior chest, right;anterior chest, left    Character tightness       Cognitive/Neuro/Behavioral WDL    Cognitive/Neuro/Behavioral WDL WDL

## 2023-03-12 LAB
ATRIAL RATE - MUSE: 83 BPM
DIASTOLIC BLOOD PRESSURE - MUSE: NORMAL MMHG
INTERPRETATION ECG - MUSE: NORMAL
P AXIS - MUSE: 68 DEGREES
PR INTERVAL - MUSE: 158 MS
QRS DURATION - MUSE: 82 MS
QT - MUSE: 366 MS
QTC - MUSE: 430 MS
R AXIS - MUSE: 48 DEGREES
SYSTOLIC BLOOD PRESSURE - MUSE: NORMAL MMHG
T AXIS - MUSE: 62 DEGREES
VENTRICULAR RATE- MUSE: 83 BPM

## 2023-03-12 PROCEDURE — 80053 COMPREHEN METABOLIC PANEL: CPT | Performed by: EMERGENCY MEDICINE

## 2023-03-12 PROCEDURE — 85025 COMPLETE CBC W/AUTO DIFF WBC: CPT | Performed by: EMERGENCY MEDICINE

## 2023-03-12 PROCEDURE — 96374 THER/PROPH/DIAG INJ IV PUSH: CPT | Mod: 59

## 2023-03-12 PROCEDURE — 93005 ELECTROCARDIOGRAM TRACING: CPT

## 2023-03-12 PROCEDURE — 82248 BILIRUBIN DIRECT: CPT | Performed by: EMERGENCY MEDICINE

## 2023-03-12 PROCEDURE — 84484 ASSAY OF TROPONIN QUANT: CPT | Performed by: EMERGENCY MEDICINE

## 2023-03-12 PROCEDURE — 83690 ASSAY OF LIPASE: CPT | Performed by: EMERGENCY MEDICINE

## 2023-03-12 PROCEDURE — 99285 EMERGENCY DEPT VISIT HI MDM: CPT | Mod: 25

## 2023-03-12 PROCEDURE — 36415 COLL VENOUS BLD VENIPUNCTURE: CPT | Performed by: EMERGENCY MEDICINE

## 2023-03-12 RX ORDER — ASPIRIN 81 MG/1
324 TABLET, CHEWABLE ORAL ONCE
Status: COMPLETED | OUTPATIENT
Start: 2023-03-12 | End: 2023-03-13

## 2023-03-13 ENCOUNTER — APPOINTMENT (OUTPATIENT)
Dept: CT IMAGING | Facility: CLINIC | Age: 48
End: 2023-03-13
Attending: EMERGENCY MEDICINE
Payer: COMMERCIAL

## 2023-03-13 ENCOUNTER — HOSPITAL ENCOUNTER (EMERGENCY)
Facility: CLINIC | Age: 48
Discharge: HOME | End: 2023-03-13
Attending: EMERGENCY MEDICINE | Admitting: EMERGENCY MEDICINE
Payer: COMMERCIAL

## 2023-03-13 VITALS
OXYGEN SATURATION: 97 % | SYSTOLIC BLOOD PRESSURE: 98 MMHG | WEIGHT: 170 LBS | HEIGHT: 66 IN | BODY MASS INDEX: 27.32 KG/M2 | TEMPERATURE: 98.2 F | HEART RATE: 91 BPM | RESPIRATION RATE: 15 BRPM | DIASTOLIC BLOOD PRESSURE: 72 MMHG

## 2023-03-13 DIAGNOSIS — E04.1 THYROID NODULE: ICD-10-CM

## 2023-03-13 DIAGNOSIS — R07.9 CHEST PAIN, UNSPECIFIED TYPE: ICD-10-CM

## 2023-03-13 LAB
ALBUMIN SERPL BCG-MCNC: 4.3 G/DL (ref 3.5–5.2)
ALP SERPL-CCNC: 57 U/L (ref 35–104)
ALT SERPL W P-5'-P-CCNC: 32 U/L (ref 10–35)
ANION GAP SERPL CALCULATED.3IONS-SCNC: 8 MMOL/L (ref 7–15)
AST SERPL W P-5'-P-CCNC: 27 U/L (ref 10–35)
BASOPHILS # BLD AUTO: 0 10E3/UL (ref 0–0.2)
BASOPHILS NFR BLD AUTO: 1 %
BILIRUB DIRECT SERPL-MCNC: <0.2 MG/DL (ref 0–0.3)
BILIRUB SERPL-MCNC: 0.7 MG/DL
BUN SERPL-MCNC: 9.5 MG/DL (ref 6–20)
CALCIUM SERPL-MCNC: 9.2 MG/DL (ref 8.6–10)
CHLORIDE SERPL-SCNC: 102 MMOL/L (ref 98–107)
CREAT SERPL-MCNC: 0.72 MG/DL (ref 0.51–0.95)
DEPRECATED HCO3 PLAS-SCNC: 26 MMOL/L (ref 22–29)
EOSINOPHIL # BLD AUTO: 0.1 10E3/UL (ref 0–0.7)
EOSINOPHIL NFR BLD AUTO: 2 %
ERYTHROCYTE [DISTWIDTH] IN BLOOD BY AUTOMATED COUNT: 12.6 % (ref 10–15)
GFR SERPL CREATININE-BSD FRML MDRD: >90 ML/MIN/1.73M2
GLUCOSE SERPL-MCNC: 106 MG/DL (ref 70–99)
HCT VFR BLD AUTO: 38.9 % (ref 35–47)
HGB BLD-MCNC: 13.4 G/DL (ref 11.7–15.7)
IMM GRANULOCYTES # BLD: 0 10E3/UL
IMM GRANULOCYTES NFR BLD: 0 %
LIPASE SERPL-CCNC: 17 U/L (ref 13–60)
LYMPHOCYTES # BLD AUTO: 2 10E3/UL (ref 0.8–5.3)
LYMPHOCYTES NFR BLD AUTO: 31 %
MCH RBC QN AUTO: 32.1 PG (ref 26.5–33)
MCHC RBC AUTO-ENTMCNC: 34.4 G/DL (ref 31.5–36.5)
MCV RBC AUTO: 93 FL (ref 78–100)
MONOCYTES # BLD AUTO: 0.6 10E3/UL (ref 0–1.3)
MONOCYTES NFR BLD AUTO: 10 %
NEUTROPHILS # BLD AUTO: 3.6 10E3/UL (ref 1.6–8.3)
NEUTROPHILS NFR BLD AUTO: 56 %
NRBC # BLD AUTO: 0 10E3/UL
NRBC BLD AUTO-RTO: 0 /100
PLATELET # BLD AUTO: 205 10E3/UL (ref 150–450)
POTASSIUM SERPL-SCNC: 3.9 MMOL/L (ref 3.4–5.3)
PROT SERPL-MCNC: 6.7 G/DL (ref 6.4–8.3)
RBC # BLD AUTO: 4.18 10E6/UL (ref 3.8–5.2)
SODIUM SERPL-SCNC: 136 MMOL/L (ref 136–145)
TROPONIN T SERPL HS-MCNC: <6 NG/L
WBC # BLD AUTO: 6.4 10E3/UL (ref 4–11)

## 2023-03-13 PROCEDURE — 71275 CT ANGIOGRAPHY CHEST: CPT

## 2023-03-13 PROCEDURE — 250N000009 HC RX 250: Performed by: EMERGENCY MEDICINE

## 2023-03-13 PROCEDURE — 250N000013 HC RX MED GY IP 250 OP 250 PS 637: Performed by: EMERGENCY MEDICINE

## 2023-03-13 PROCEDURE — 250N000011 HC RX IP 250 OP 636: Performed by: EMERGENCY MEDICINE

## 2023-03-13 RX ORDER — IOPAMIDOL 755 MG/ML
65 INJECTION, SOLUTION INTRAVASCULAR ONCE
Status: COMPLETED | OUTPATIENT
Start: 2023-03-13 | End: 2023-03-13

## 2023-03-13 RX ORDER — KETOROLAC TROMETHAMINE 15 MG/ML
15 INJECTION, SOLUTION INTRAMUSCULAR; INTRAVENOUS ONCE
Status: COMPLETED | OUTPATIENT
Start: 2023-03-13 | End: 2023-03-13

## 2023-03-13 RX ADMIN — LIDOCAINE HYDROCHLORIDE 30 ML: 20 SOLUTION ORAL; TOPICAL at 03:38

## 2023-03-13 RX ADMIN — KETOROLAC TROMETHAMINE 15 MG: 15 INJECTION, SOLUTION INTRAMUSCULAR; INTRAVENOUS at 00:40

## 2023-03-13 RX ADMIN — IOPAMIDOL 65 ML: 755 INJECTION, SOLUTION INTRAVENOUS at 01:19

## 2023-03-13 RX ADMIN — SODIUM CHLORIDE 90 ML: 900 INJECTION INTRAVENOUS at 01:20

## 2023-03-13 RX ADMIN — ASPIRIN 81 MG CHEWABLE TABLET 324 MG: 81 TABLET CHEWABLE at 00:16

## 2023-03-13 ASSESSMENT — ENCOUNTER SYMPTOMS
CHEST TIGHTNESS: 1
COUGH: 0
FEVER: 0
DIARRHEA: 0

## 2023-03-13 ASSESSMENT — ACTIVITIES OF DAILY LIVING (ADL)
ADLS_ACUITY_SCORE: 35
ADLS_ACUITY_SCORE: 35

## 2023-03-13 NOTE — ED TRIAGE NOTES
Patient awoke from out of sleep with 9/10 chest pain in left chest with radiation to left shoulder and arm. patient denies sob and diaphoresis.      Triage Assessment     Row Name 03/12/23 1942       Triage Assessment (Adult)    Airway WDL WDL       Respiratory WDL    Respiratory WDL WDL       Skin Circulation/Temperature WDL    Skin Circulation/Temperature WDL WDL       Cardiac WDL    Cardiac WDL X;chest pain       Chest Pain Assessment    Chest Pain Location anterior chest, left    Chest Pain Radiation arm;shoulder    Character aching;burning

## 2023-03-13 NOTE — DISCHARGE INSTRUCTIONS
You can also try maalox again if needed    Discharge Instructions  Chest Pain    You have been seen today for chest pain or discomfort.  At this time, your provider has found no signs that your chest pain is due to a serious or life-threatening condition, (or you have declined more testing and/or admission to the hospital). However, sometimes there is a serious problem that does not show up right away. Your evaluation today may not be complete and you may need further testing and evaluation.     Generally, every Emergency Department visit should have a follow-up clinic visit with either a primary or a specialty clinic/provider. Please follow-up as instructed by your emergency provider today.  Return to the Emergency Department if:  Your chest pain changes, gets worse, starts to happen more often, or comes with less activity.  You are newly short of breath.  You get very weak or tired.  You pass out or faint.  You have any new symptoms, like fever, cough, numb legs, or you cough up blood.  You have anything else that worries you.    Until you follow-up with your regular provider, please do the following:  Take one aspirin daily unless you have an allergy or are told not to by your provider.  If a stress test appointment has been made, go to the appointment.  If you have questions, contact your regular provider.  Follow-up with your regular provider/clinic as directed; this is very important.    If you were given a prescription for medicine here today, be sure to read all of the information (including the package insert) that comes with your prescription.  This will include important information about the medicine, its side effects, and any warnings that you need to know about.  The pharmacist who fills the prescription can provide more information and answer questions you may have about the medicine.  If you have questions or concerns that the pharmacist cannot address, please call or return to the Emergency  Department.       Remember that you can always come back to the Emergency Department if you are not able to see your regular provider in the amount of time listed above, if you get any new symptoms, or if there is anything that worries you.

## 2023-03-13 NOTE — ED PROVIDER NOTES
History     Chief Complaint:  Chest Pain       The history is provided by the patient.      Renetta Sears is a 47 year old female with a history of PE who presents with chest pain. The patient reports that she woke from sleep with chest pain that she rates at a 9/10 in severity. She explains that the pain radiates between her shoulder blades. She describes the pain as a sharp tightness. She denies experiencing diarrhea, cough, or fever. She also denies any recent falls or new activities.  The pain started after she drinks soda which she does not usually do and she feels that this is what is causing her pain.  She feels that she needs to burp.    Independent Historian:   None - Patient Only    Review of External Notes: None    ROS:  Review of Systems   Constitutional: Negative for fever.   Respiratory: Positive for chest tightness. Negative for cough.    Cardiovascular: Positive for chest pain.   Gastrointestinal: Negative for diarrhea.   All other systems reviewed and are negative.    Allergies:  Estrogens  Estrogens  Medroxyprogesterone     Medications:    Amitriptyline  Cyclobenzaprine  Doxycycline monohydrate  Ferrous sulfate  Fluconazole  Hydroxyzine  Methylprednisolone  Montelukast  Spironolactone    Past Medical History:    Acute cholecystitis  Anxiety  Chronic headaches  Esophagitis medicamentosa  Herpes simplex virus infection  Hidradenitis  Hyperlipidemia  Obesity  PE and infarction  Major depressive disorder  Chronic urethritis    Past Surgical History:    Cervix repair  Cholecystectomy  Ventral herniorrhaphy  Hysterectomy  Tubal ligation  Tonsillectomy   Robot assisted laparoscopic recurrent ventral hernia repair with mesh  Left vulvectomy, partial    Family History:    Mother: Arthritis, thyroid disease    Social History:  The patient presents to the ED alone.  She arrived via private vehicle.  PCP: Merrill Iqbal     Physical Exam     Patient Vitals for the past 24 hrs:   BP Temp Temp src Pulse  "Resp SpO2 Height Weight   03/13/23 0031 -- -- -- 89 19 92 % -- --   03/13/23 0015 111/77 -- -- 69 14 96 % -- --   03/12/23 2337 116/65 98.2  F (36.8  C) Oral 87 20 98 % 1.676 m (5' 6\") 77.1 kg (170 lb)        Physical Exam  General: Sitting up in bed  Eyes:  The pupils are equal and round    Conjunctivae and sclerae are normal  ENT:    Wearing a mask  Neck:  Normal range of motion  CV:  Regular rate, regular rhythm     Skin warm and well perfused   Resp:  Non labored breathing on room air    No tachypnea    No cough heard, lungs clear bilaterally  GI:  Abdomen is soft, there is no rigidity    No distension    No rebound tenderness     No abdominal tenderness  MS:  Normal muscular tone  Skin:  No rash or acute skin lesions noted  Neuro:   Awake, alert.      Speech is normal and fluent.    Face is symmetric.     Moves all extremities equally  Psych: Normal affect.  Appropriate interactions.    Emergency Department Course   ECG  ECG taken at 2337, ECG read at 2341  Normal sinus rhythm.   No significant changes as compared to prior, dated 1/11/23.  Rate 83 bpm. DE interval 158 ms. QRS duration 82 ms. QT/QTc 366/430 ms. P-R-T axes 68 48 62.     Imaging:  CT Chest Pulmonary Embolism w Contrast   Final Result   IMPRESSION:   1.  No pulmonary emboli on either side. Lungs are clear. No adenopathy or effusion.      2.  Normal caliber thoracic aorta without aneurysm or dissection. Normal cardiac size. No pericardial effusion.      3.  Left thyroid lobe nodule can be better evaluated with targeted ultrasound.      4.  Cholecystectomy. Small simple cyst in the left hepatic lobe, no follow-up required.      5.  Exam stable when compared to prior study.         Report per radiology    Laboratory:  Labs Ordered and Resulted from Time of ED Arrival to Time of ED Departure   BASIC METABOLIC PANEL - Abnormal       Result Value    Sodium 136      Potassium 3.9      Chloride 102      Carbon Dioxide (CO2) 26      Anion Gap 8      Urea " Nitrogen 9.5      Creatinine 0.72      Calcium 9.2      Glucose 106 (*)     GFR Estimate >90     TROPONIN T, HIGH SENSITIVITY - Normal    Troponin T, High Sensitivity <6     CBC WITH PLATELETS AND DIFFERENTIAL    WBC Count 6.4      RBC Count 4.18      Hemoglobin 13.4      Hematocrit 38.9      MCV 93      MCH 32.1      MCHC 34.4      RDW 12.6      Platelet Count 205      % Neutrophils 56      % Lymphocytes 31      % Monocytes 10      % Eosinophils 2      % Basophils 1      % Immature Granulocytes 0      NRBCs per 100 WBC 0      Absolute Neutrophils 3.6      Absolute Lymphocytes 2.0      Absolute Monocytes 0.6      Absolute Eosinophils 0.1      Absolute Basophils 0.0      Absolute Immature Granulocytes 0.0      Absolute NRBCs 0.0     LIPASE   HEPATIC FUNCTION PANEL        Emergency Department Course & Assessments:       Interventions:  Medications   aspirin (ASA) chewable tablet 324 mg (324 mg Oral $Given 3/13/23 0016)   ketorolac (TORADOL) injection 15 mg (15 mg Intravenous $Given 3/13/23 0040)   iopamidol (ISOVUE-370) solution 65 mL (65 mLs Intravenous $Given 3/13/23 0119)   Saline (90 mLs Intravenous $Given 3/13/23 0120)   lidocaine (viscous) (XYLOCAINE) 2 % 15 mL, alum & mag hydroxide-simethicone (MAALOX) 15 mL GI Cocktail (30 mLs Oral $Given 3/13/23 0338)        Assessments:  0032 I obtained history and examined the patient.    Disposition:  The patient was discharged to home.     Impression & Plan      Medical Decision Making:  Renetta Sears is a 47-year-old female who presented to the emergency department with chest pain.  She thinks that it started after drinking soda and this is what is causing the pain.  She has no abdominal tenderness on exam.  She does have a history of PE and so CT chest done which shows no PE.  Does have a thyroid nodule which she is aware of and has had already had a thyroid ultrasound in the past.  Troponin is normal.  Second troponin not indicated.  EKG with no acute ischemic  changes.  She had some improvement in her pain with Toradol but the most pain improvement was actually from the GI cocktail.  She reports feeling much better after the GI cocktail.  Cardiac cause of her pain seems unlikely and I do not think follow-up stress test is indicated at this time.  Doubt dissection.  Doubt referred pain from the abdomen.  Discussed symptomatic treatment at home.  Recommend follow-up with primary care provider if not improving.    Diagnosis:    ICD-10-CM    1. Chest pain, unspecified type  R07.9       2. Thyroid nodule  E04.1          Scribe Disclosure:  I, Jenny Peguero, am serving as a scribe at 12:35 AM on 3/13/2023 to document services personally performed by Salina Salcedo MD based on my observations and the provider's statements to me.      Salina Salcedo MD  03/13/23 0628

## 2023-04-02 ENCOUNTER — HOSPITAL ENCOUNTER (EMERGENCY)
Facility: CLINIC | Age: 48
Discharge: HOME | End: 2023-04-02
Attending: EMERGENCY MEDICINE | Admitting: EMERGENCY MEDICINE
Payer: COMMERCIAL

## 2023-04-02 VITALS
HEIGHT: 67 IN | BODY MASS INDEX: 26.68 KG/M2 | DIASTOLIC BLOOD PRESSURE: 73 MMHG | HEART RATE: 83 BPM | TEMPERATURE: 98.5 F | WEIGHT: 170 LBS | RESPIRATION RATE: 16 BRPM | SYSTOLIC BLOOD PRESSURE: 125 MMHG | OXYGEN SATURATION: 99 %

## 2023-04-02 DIAGNOSIS — G89.18 POSTOPERATIVE PAIN: ICD-10-CM

## 2023-04-02 PROCEDURE — 99284 EMERGENCY DEPT VISIT MOD MDM: CPT

## 2023-04-02 PROCEDURE — 250N000013 HC RX MED GY IP 250 OP 250 PS 637: Performed by: EMERGENCY MEDICINE

## 2023-04-02 RX ORDER — CEPHALEXIN 500 MG/1
500 CAPSULE ORAL 2 TIMES DAILY
COMMUNITY

## 2023-04-02 RX ORDER — ONDANSETRON 4 MG/1
4 TABLET, FILM COATED ORAL EVERY 8 HOURS PRN
COMMUNITY

## 2023-04-02 RX ORDER — OXYCODONE HYDROCHLORIDE 5 MG/1
5 TABLET ORAL EVERY 6 HOURS PRN
COMMUNITY

## 2023-04-02 RX ORDER — GABAPENTIN 300 MG/1
300 CAPSULE ORAL 3 TIMES DAILY
Qty: 20 CAPSULE | Refills: 0 | Status: SHIPPED | OUTPATIENT
Start: 2023-04-02

## 2023-04-02 RX ORDER — CYCLOBENZAPRINE HCL 10 MG
10 TABLET ORAL ONCE
Status: COMPLETED | OUTPATIENT
Start: 2023-04-02 | End: 2023-04-02

## 2023-04-02 RX ORDER — CYCLOBENZAPRINE HCL 10 MG
10 TABLET ORAL 3 TIMES DAILY PRN
Qty: 20 TABLET | Refills: 0 | Status: SHIPPED | OUTPATIENT
Start: 2023-04-02 | End: 2023-04-08

## 2023-04-02 RX ORDER — GABAPENTIN 300 MG/1
300 CAPSULE ORAL ONCE
Status: COMPLETED | OUTPATIENT
Start: 2023-04-02 | End: 2023-04-02

## 2023-04-02 RX ADMIN — CYCLOBENZAPRINE 10 MG: 10 TABLET, FILM COATED ORAL at 04:44

## 2023-04-02 RX ADMIN — GABAPENTIN 300 MG: 300 CAPSULE ORAL at 04:45

## 2023-04-02 ASSESSMENT — ENCOUNTER SYMPTOMS
FEVER: 0
ABDOMINAL PAIN: 1
FLANK PAIN: 1

## 2023-04-02 ASSESSMENT — ACTIVITIES OF DAILY LIVING (ADL): ADLS_ACUITY_SCORE: 35

## 2023-04-02 NOTE — ED TRIAGE NOTES
"Pt had \"Krly279 with skin removal\" Friday has 2 drains. States leaking around L drain and pain not helped with prescribed pain medications and states having muscle spasms as well.     Triage Assessment     Row Name 04/02/23 0334       Triage Assessment (Adult)    Airway WDL WDL       Respiratory WDL    Respiratory WDL WDL       Skin Circulation/Temperature WDL    Skin Circulation/Temperature WDL X  has drainage tubes       Cardiac WDL    Cardiac WDL WDL       Peripheral/Neurovascular WDL    Peripheral Neurovascular WDL WDL       Cognitive/Neuro/Behavioral WDL    Cognitive/Neuro/Behavioral WDL WDL              "

## 2023-04-02 NOTE — ED PROVIDER NOTES
"  History     Chief Complaint:  Post-op Problem       HPI   Renetta Sears is a 47 year old female, s/p \"Lipo 360\" procedure two days ago, with a history of hyperlipidemia and PE who presents with ongoing pain and muscle spasms near the procedure site. Denies recent fevers associated with the pain. Patient states that she was given gabapentin, antibiotics, xanax, and percocet prior to the procedure. She was then sent home with oxycodone 5 mg but says this is not offering relief for the pain. She contacted her surgeon about the pain yesterday and was told to double up on her oxycodone, which still has not been effective. She was not prescribed anything for her muscle spasms at this time. Renetta was also told not to take Ibuprofen after the procedure. Since the surgery she reports having a normal and consistent amount of drainage from both of her TIERA drains. Her follow up appointment with the surgeon is on 4/7. No personal history of HTN or diabetes.     Independent Historian:   None - Patient Only    Review of External Notes: I reviewed past ED and PCP visits.      ROS:  Review of Systems   Constitutional: Negative for fever.   Gastrointestinal: Positive for abdominal pain.   Genitourinary: Positive for flank pain.   All other systems reviewed and are negative.    Allergies:  Estrogens  Medroxyprogesterone     Medications:   Keflex  Medrol Dosepak  Zofran  Roxicodone  Elavil   Zyrtec  Monodox   Valtrex  Aldactone  Singulair  Atarax  Diflucan   Senna    Past Medical History:    Anxiety   Chronic headaches  Depression   Esophagitis medicamentosa  HSV infection   Hidradenitis  Mixed hyperlipidemia   Obesity  PE  Chronic urethritis   Post concussion syndrome  Vitamin D deficiency   Allergic rhinitis   Thyroid nodule   Recurrent vaginitis   Enlarged uterus    Past Surgical History:    Laparoscopic cholecystectomy   Cervix repair surgery   DaVinci ventral herniorrhaphy   Umbilical hernia repair  Tonsillectomy & " "adenoidectomy   Hysterectomy, bilateral salpingectomy   Tubal ligation    Simple left vulvectomy   Lipo 360    Family History:    Mother: RA, thyroid disease, high cholesterol, depression  Father: Depression, diabetes, HTN, high cholesterol   Sister: Depression   Daughter (s): Anxiety, depression   Son (s): Anxiety, depression, drug abuse    Social History:  Arrives via EMS with a friend.   PCP: Merrill Iqbal MD, Internal Medicine    Physical Exam     Patient Vitals for the past 24 hrs:   BP Temp Temp src Pulse Resp SpO2 Height Weight   04/02/23 0420 -- -- -- -- -- 99 % -- --   04/02/23 0419 125/73 -- -- 83 -- 98 % -- --   04/02/23 0335 (!) 141/79 98.5  F (36.9  C) Oral 93 16 98 % 1.702 m (5' 7\") 77.1 kg (170 lb)        Physical Exam  General: Alert, interactive in mild distress  Head:  Scalp is atraumatic  Eyes:  The pupils are equal, round, and reactive to light    EOM's intact    No scleral icterus  ENT:      Nose:  The external nose is normal  Ears:  External ears are normal  Mouth/Throat: The oropharynx is normal    Mucus membranes are moist      Neck:  Normal range of motion.      There is no rigidity.    Trachea is in the midline         CV:  Regular rate and rhythm    No murmur   Resp:  Breath sounds are clear bilaterally    Non-labored, no retractions or accessory muscle use      GI:  Abdomen is soft, no distension, diffuse tenderness.      Multiple TIERA drains in place around the abdomen draining serosanguinous fluid. No signs of infection around the left flank site.      MS:  Normal strength in all 4 extremities  Skin:  Warm and dry, No rash or lesions noted.  Neuro: Strength 5/5 x4.     GCS: 15  Psych:  Awake. Alert.  Normal affect.      Appropriate interactions.    Emergency Department Course       Emergency Department Course & Assessments:       Interventions:  Medications   cyclobenzaprine (FLEXERIL) tablet 10 mg (10 mg Oral $Given 4/2/23 5598)   gabapentin (NEURONTIN) capsule 300 mg (300 mg Oral " $Given 4/2/23 3733)        Assessments:  0428 I obtained history and examined the patient as noted above.  0612 I rechecked the patient and explained findings. She feels improved and is comfortable with discharge at this time.     Social Determinants of Health affecting care:   None    Disposition:  The patient was discharged to home.     Impression & Plan      Medical Decision Making:  Following presentation history and physical examination were performed, on examination there is no signs of an acute infection and the fluid in her TIERA drains appears to be normal.  She is complaining of significant muscle spasm and received the medications above with significant improvement of her symptoms.  She is hemodynamically stable she has close follow-up with her plastic surgeon and feels comfortable with discharged home and I think this is reasonable I see no signs of an acute secondary infection and I doubt significant intra-abdominal pathology.  Patient was subsequently discharged home.      Diagnosis:    ICD-10-CM    1. Postoperative pain  G89.18            Discharge Medications:  New Prescriptions    CYCLOBENZAPRINE (FLEXERIL) 10 MG TABLET    Take 1 tablet (10 mg) by mouth 3 times daily as needed for muscle spasms    GABAPENTIN (NEURONTIN) 300 MG CAPSULE    Take 1 capsule (300 mg) by mouth 3 times daily          Scribe Disclosure:  Lalita AGUSTIN, am serving as a scribe at 4:28 AM on 4/2/2023 to document services personally performed by Hesham Biggs MD based on my observations and the provider's statements to me.   4/2/2023   Hesham Biggs MD Trigger, Brandon Thomas, MD  04/02/23 0732

## 2023-04-16 ENCOUNTER — HEALTH MAINTENANCE LETTER (OUTPATIENT)
Age: 48
End: 2023-04-16

## 2023-05-21 ENCOUNTER — HOSPITAL ENCOUNTER (EMERGENCY)
Facility: CLINIC | Age: 48
Discharge: HOME | End: 2023-05-21
Attending: EMERGENCY MEDICINE | Admitting: EMERGENCY MEDICINE
Payer: COMMERCIAL

## 2023-05-21 VITALS
TEMPERATURE: 97.5 F | HEART RATE: 95 BPM | DIASTOLIC BLOOD PRESSURE: 58 MMHG | WEIGHT: 172 LBS | RESPIRATION RATE: 18 BRPM | BODY MASS INDEX: 27.64 KG/M2 | OXYGEN SATURATION: 98 % | SYSTOLIC BLOOD PRESSURE: 127 MMHG | HEIGHT: 66 IN

## 2023-05-21 DIAGNOSIS — N30.01 ACUTE CYSTITIS WITH HEMATURIA: ICD-10-CM

## 2023-05-21 LAB
ALBUMIN UR-MCNC: ABNORMAL G/DL
APPEARANCE UR: ABNORMAL
BILIRUB UR QL STRIP: ABNORMAL
COLOR UR AUTO: ABNORMAL
GLUCOSE UR STRIP-MCNC: ABNORMAL MG/DL
HGB UR QL STRIP: ABNORMAL
KETONES UR STRIP-MCNC: ABNORMAL MG/DL
LEUKOCYTE ESTERASE UR QL STRIP: ABNORMAL
MUCOUS THREADS #/AREA URNS LPF: PRESENT /LPF
NITRATE UR QL: ABNORMAL
PH UR STRIP: ABNORMAL [PH]
RBC URINE: 130 /HPF
SP GR UR STRIP: 1.02 (ref 1–1.03)
SQUAMOUS EPITHELIAL: 6 /HPF
UROBILINOGEN UR STRIP-MCNC: ABNORMAL MG/DL
WBC CLUMPS #/AREA URNS HPF: PRESENT /HPF
WBC URINE: >182 /HPF

## 2023-05-21 PROCEDURE — 99283 EMERGENCY DEPT VISIT LOW MDM: CPT

## 2023-05-21 PROCEDURE — 87186 SC STD MICRODIL/AGAR DIL: CPT | Performed by: EMERGENCY MEDICINE

## 2023-05-21 PROCEDURE — 81001 URINALYSIS AUTO W/SCOPE: CPT | Performed by: EMERGENCY MEDICINE

## 2023-05-21 PROCEDURE — 250N000013 HC RX MED GY IP 250 OP 250 PS 637: Performed by: EMERGENCY MEDICINE

## 2023-05-21 RX ORDER — CEPHALEXIN 500 MG/1
500 CAPSULE ORAL ONCE
Status: COMPLETED | OUTPATIENT
Start: 2023-05-21 | End: 2023-05-21

## 2023-05-21 RX ORDER — CEPHALEXIN 500 MG/1
500 CAPSULE ORAL 2 TIMES DAILY
Qty: 14 CAPSULE | Refills: 0 | Status: SHIPPED | OUTPATIENT
Start: 2023-05-21 | End: 2023-05-28

## 2023-05-21 RX ADMIN — CEPHALEXIN 500 MG: 500 CAPSULE ORAL at 07:18

## 2023-05-21 ASSESSMENT — ACTIVITIES OF DAILY LIVING (ADL): ADLS_ACUITY_SCORE: 35

## 2023-05-21 NOTE — ED TRIAGE NOTES
Patient presents with hematuria, urinary frequency, and pain with urination that started yesterday.  She took AZO without improvement of symptoms.     Triage Assessment       Row Name 05/21/23 0637       Triage Assessment (Adult)    Airway WDL WDL       Respiratory WDL    Respiratory WDL WDL       Skin Circulation/Temperature WDL    Skin Circulation/Temperature WDL WDL       Cardiac WDL    Cardiac WDL WDL       Peripheral/Neurovascular WDL    Peripheral Neurovascular WDL WDL       Cognitive/Neuro/Behavioral WDL    Cognitive/Neuro/Behavioral WDL WDL

## 2023-05-21 NOTE — ED PROVIDER NOTES
"  History   Chief Complaint:  UTI     The history is provided by the patient and medical records.      Renetta Sears is a 47 year old female with history of hyperlipidemia and pulmonary embolism who presents with UTI symptoms. The patient states she noted light-pink discharge on the toilet paper at 0800 after urination. She noted she had a left partial vulvectomy in February and is still healing, and initially thought it was due to that. At 2300, she had continued pink discharge on the toilet paper after urination. She developed a low-grade fever with chills overnight, which was accompanied by urinary urgency and frequency. She endorses dysuria, right flank pain, and mild pelvic pain as well. She has taken AZO supplements at home without improvement of symptoms. The patient was unaccompanied to the emergency department.    Independent Historian:   None - Patient Only    Review of External Notes:   none     Medications:    Elavil  Monodox  Keflex  Diflucan  Neurontin  Atarax  Singulair   Zofran  Roxicodone  Aldactone  Valtrex     Past Medical History:    Anxiety disorder  Major depressive disorder  Chronic headaches  Chronic urethritis   Esophagitis medicamentosa  HSV infection  Hyperlipidemia  Obesity   Pulmonary embolism and infarction   Radiculopathy of cervical region   Thyroid nodule   Vitamin D deficiency     Past Surgical History:    Cervix surgery   Cholecystectomy  DaVinci herniorrhaphy, ventral   Tonsillectomy  Adenoidectomy  Hernia repair  Hysterectomy with bilateral salpingectomy  Tubal ligation   Partial vulvectomy, left     Physical Exam     Patient Vitals for the past 24 hrs:   BP Temp Temp src Pulse Resp SpO2 Height Weight   05/21/23 0635 127/58 97.5  F (36.4  C) Temporal 95 16 98 % 1.676 m (5' 6\") 78 kg (172 lb)      Physical Exam  General/Appearance: appears stated age, well-groomed, appears comfortable  Eyes: EOMI, no scleral injection, no icterus  ENT: MMM  Neck: supple, nl ROM, no " stiffness  Cardiovascular: RRR, nl S1S2, no m/r/g, 2+ pulses in all 4 extremities, cap refill <2sec  Respiratory: CTAB, good air movement throughout, no wheezes/rhonchi/rales, no increased WOB, no retractions  GI: abd soft, non-distended, nttp,  no HSM, no rebound, no guarding, nl BS  MSK: LEZAMA, good tone, no bony abnormality  Skin: warm and well-perfused, no rash, no edema, no ecchymosis, nl turgor  Neuro: GCS 15, alert and oriented, no gross focal neuro deficits  Psych: interacts appropriately  Heme: no petechia, no purpura, no active bleeding        Emergency Department Course   Laboratory:  Labs Ordered and Resulted from Time of ED Arrival to Time of ED Departure   ROUTINE UA WITH MICROSCOPIC REFLEX TO CULTURE - Abnormal       Result Value    Color Urine Orange (*)     Appearance Urine Hazy (*)     Glucose Urine        Bilirubin Urine        Ketones Urine        Specific Gravity Urine 1.020      Blood Urine        pH Urine        Protein Albumin Urine        Urobilinogen Urine        Nitrite Urine        Leukocyte Esterase Urine        WBC Clumps Urine Present (*)     Mucus Urine Present (*)     RBC Urine 130 (*)     WBC Urine >182 (*)     Squamous Epithelials Urine 6 (*)    URINE CULTURE      Emergency Department Course & Assessments:     Interventions:  Medications   cephALEXin (KEFLEX) capsule 500 mg (has no administration in time range)      Assessments:  0700 I obtained history and performed an exam of the patient as documented above.  0707 I rechecked the patient and explained urinalysis findings.    Independent Interpretation (X-rays, CTs, rhythm strip):  None    Consultations/Discussion of Management or Tests:  None     Social Determinants of Health affecting care:   None    Disposition:  The patient was discharged to home.     Impression & Plan    Medical Decision Making:  This patient has symptoms consistent with a urinary tract infection.  Urinalysis confirms the infection.  There has been no fever or  significant abdominal pain.  There is no clinical evidence of pyelonephritis, appendicitis,  or diverticulitis.  The patient will be started on antibiotics for the infection. Return if increasing pain, vomiting, fever, or inability to tolerate the oral antibiotic.  Follow up with primary physician is indicated if not improving in 2-3 days.    Diagnosis:    ICD-10-CM    1. Acute cystitis with hematuria  N30.01          Discharge Medications:  New Prescriptions    CEPHALEXIN (KEFLEX) 500 MG CAPSULE    Take 1 capsule (500 mg) by mouth 2 times daily for 7 days      Scribe Disclosure:  Kayli AGUSTIN, am serving as a scribe at 6:56 AM on 5/21/2023 to document services personally performed by Mae Hall MD based on my observations and the provider's statements to me.      Mae Hall MD  05/21/23 8369

## 2023-05-22 LAB — BACTERIA UR CULT: ABNORMAL

## 2023-05-25 NOTE — ED AVS SNAPSHOT
[facility] Emergency Dept  [address] [address]  Phone: [phone]  Fax: [phone]                                    Renetta Sears   MRN: [id number]    Department: [facility] Emergency Dept   Date of Visit: 10/21/2020           After Visit Summary Signature Page    I have received my discharge instructions, and my questions have been answered. I have discussed any challenges I see with this plan with the nurse or doctor.    ..........................................................................................................................................  Patient/Patient Representative Signature      ..........................................................................................................................................  Patient Representative Print Name and Relationship to Patient    ..................................................               ................................................  Date                                   Time    ..........................................................................................................................................  Reviewed by Signature/Title    ...................................................              ..............................................  Date                                               Time          22EPIC Rev 08/18        For you.

## 2023-09-07 ENCOUNTER — HOSPITAL ENCOUNTER (EMERGENCY)
Facility: CLINIC | Age: 48
Discharge: LEFT BEFORE DISCHARGE | End: 2023-09-07
Admitting: EMERGENCY MEDICINE
Payer: COMMERCIAL

## 2023-09-07 VITALS
RESPIRATION RATE: 16 BRPM | SYSTOLIC BLOOD PRESSURE: 124 MMHG | HEART RATE: 85 BPM | DIASTOLIC BLOOD PRESSURE: 59 MMHG | OXYGEN SATURATION: 98 % | TEMPERATURE: 98.1 F

## 2023-09-07 PROCEDURE — 99281 EMR DPT VST MAYX REQ PHY/QHP: CPT

## 2023-09-08 NOTE — ED TRIAGE NOTES
Gray-like, foul odorous discharge x4 days. Pt was at oncologist for follow up of labia surgery for vaginal/vulva ca. Pt was sent into ED for eval.      Triage Assessment       Row Name 09/07/23 1950       Respiratory WDL    Respiratory WDL WDL       Cardiac WDL    Cardiac WDL WDL       Cognitive/Neuro/Behavioral WDL    Cognitive/Neuro/Behavioral WDL WDL

## 2023-09-17 ENCOUNTER — HEALTH MAINTENANCE LETTER (OUTPATIENT)
Age: 48
End: 2023-09-17

## 2024-06-23 ENCOUNTER — HEALTH MAINTENANCE LETTER (OUTPATIENT)
Age: 49
End: 2024-06-23

## (undated) DEVICE — LIGHT HANDLE X2

## (undated) DEVICE — GOWN IMPERVIOUS SPECIALTY XLG/XLONG 32474

## (undated) DEVICE — SU WND CLOSURE VLOC 180 ABS 2-0 9" GS-21 VLOCL0345

## (undated) DEVICE — BLADE CLIPPER 4406

## (undated) DEVICE — GLOVE PROTEXIS W/NEU-THERA 7.0  2D73TE70

## (undated) DEVICE — DAVINCI XI OBTURATOR 8MM BLADELESS LONG 470360

## (undated) DEVICE — PACK LAP CHOLE SLC15LCFSD

## (undated) DEVICE — SU STRATAFIX PDS PLUS 0 CT-1 30CM SXPP1B450

## (undated) DEVICE — GLOVE PROTEXIS BLUE W/NEU-THERA 7.0  2D73EB70

## (undated) DEVICE — LUBRICANT INST ELECTROLUBE EL101

## (undated) DEVICE — DAVINCI XI SEAL UNIVERSAL 5-8MM 470361

## (undated) DEVICE — DAVINCI XI MONOPOLAR SCISSORS HOT SHEARS 8MM 470179

## (undated) DEVICE — DAVINCI XI OBTURATOR BLADELESS 8MM 470359

## (undated) DEVICE — PREP CHLORAPREP 26ML TINTED HI-LITE ORANGE 930815

## (undated) DEVICE — SU VICRYL 2-0 SH 27" J317H

## (undated) DEVICE — GLOVE PROTEXIS MICRO 8.0  2D73PM80

## (undated) DEVICE — GOWN LG DISP 9515

## (undated) DEVICE — EVAC SYSTEM CLEAR FLOW SC082500

## (undated) DEVICE — ENDO TROCAR FIRST ENTRY KII FIOS Z-THRD 05X100MM CTF03

## (undated) DEVICE — ESU GROUND PAD UNIVERSAL W/O CORD

## (undated) DEVICE — SYSTEM LAPAROVUE VISIBILITY LAPVUE10

## (undated) DEVICE — DAVINCI HOT SHEARS TIP COVER  400180

## (undated) DEVICE — LINEN TOWEL PACK X5 5464

## (undated) DEVICE — DRAPE SHEET REV FOLD 3/4 9349

## (undated) DEVICE — NDL INSUFFLATION 13GA 120MM C2201

## (undated) DEVICE — DAVINCI XI DRAPE COLUMN 470341

## (undated) DEVICE — DAVINCI XI DRAPE ARM 470015

## (undated) DEVICE — SYR 10ML FINGER CONTROL W/O NDL 309695

## (undated) DEVICE — SU MONOCRYL 4-0 PS-2 18" UND Y496G

## (undated) DEVICE — SU STRATAFIX MONOCRYL 2-0 SPIRAL SH 20CM SXMP1B409

## (undated) DEVICE — SU WND CLOSURE VLOC 180 ABS 0 12" GS-21 VLOCL0316

## (undated) DEVICE — SOL WATER IRRIG 1000ML BOTTLE 2F7114

## (undated) RX ORDER — ACETAMINOPHEN 325 MG/1
TABLET ORAL
Status: DISPENSED
Start: 2022-07-07

## (undated) RX ORDER — EPINEPHRINE 1 MG/ML
INJECTION, SOLUTION INTRAMUSCULAR; SUBCUTANEOUS
Status: DISPENSED
Start: 2022-07-07

## (undated) RX ORDER — ALBUMIN, HUMAN INJ 5% 5 %
SOLUTION INTRAVENOUS
Status: DISPENSED
Start: 2022-07-07

## (undated) RX ORDER — HEPARIN SODIUM 5000 [USP'U]/.5ML
INJECTION, SOLUTION INTRAVENOUS; SUBCUTANEOUS
Status: DISPENSED
Start: 2022-07-07

## (undated) RX ORDER — PROPOFOL 10 MG/ML
INJECTION, EMULSION INTRAVENOUS
Status: DISPENSED
Start: 2022-07-07

## (undated) RX ORDER — BUPIVACAINE HYDROCHLORIDE AND EPINEPHRINE 5; 5 MG/ML; UG/ML
INJECTION, SOLUTION EPIDURAL; INTRACAUDAL; PERINEURAL
Status: DISPENSED
Start: 2022-07-07

## (undated) RX ORDER — LIDOCAINE HYDROCHLORIDE 20 MG/ML
INJECTION, SOLUTION EPIDURAL; INFILTRATION; INTRACAUDAL; PERINEURAL
Status: DISPENSED
Start: 2022-07-07

## (undated) RX ORDER — NEOSTIGMINE METHYLSULFATE 1 MG/ML
VIAL (ML) INJECTION
Status: DISPENSED
Start: 2022-07-07

## (undated) RX ORDER — DEXAMETHASONE SODIUM PHOSPHATE 4 MG/ML
INJECTION, SOLUTION INTRA-ARTICULAR; INTRALESIONAL; INTRAMUSCULAR; INTRAVENOUS; SOFT TISSUE
Status: DISPENSED
Start: 2022-07-07

## (undated) RX ORDER — OXYCODONE HYDROCHLORIDE 5 MG/1
TABLET ORAL
Status: DISPENSED
Start: 2022-07-07

## (undated) RX ORDER — GLYCOPYRROLATE 0.2 MG/ML
INJECTION, SOLUTION INTRAMUSCULAR; INTRAVENOUS
Status: DISPENSED
Start: 2022-07-07

## (undated) RX ORDER — ONDANSETRON 2 MG/ML
INJECTION INTRAMUSCULAR; INTRAVENOUS
Status: DISPENSED
Start: 2022-07-07

## (undated) RX ORDER — CEFAZOLIN SODIUM/WATER 2 G/20 ML
SYRINGE (ML) INTRAVENOUS
Status: DISPENSED
Start: 2022-07-07

## (undated) RX ORDER — KETOROLAC TROMETHAMINE 30 MG/ML
INJECTION, SOLUTION INTRAMUSCULAR; INTRAVENOUS
Status: DISPENSED
Start: 2022-07-07

## (undated) RX ORDER — FENTANYL CITRATE 50 UG/ML
INJECTION, SOLUTION INTRAMUSCULAR; INTRAVENOUS
Status: DISPENSED
Start: 2022-07-07

## (undated) RX ORDER — FENTANYL CITRATE 0.05 MG/ML
INJECTION, SOLUTION INTRAMUSCULAR; INTRAVENOUS
Status: DISPENSED
Start: 2022-07-07